# Patient Record
Sex: FEMALE | Race: BLACK OR AFRICAN AMERICAN | NOT HISPANIC OR LATINO | Employment: UNEMPLOYED | URBAN - METROPOLITAN AREA
[De-identification: names, ages, dates, MRNs, and addresses within clinical notes are randomized per-mention and may not be internally consistent; named-entity substitution may affect disease eponyms.]

---

## 2020-08-08 ENCOUNTER — OFFICE VISIT (OUTPATIENT)
Dept: URGENT CARE | Facility: CLINIC | Age: 55
End: 2020-08-08
Payer: COMMERCIAL

## 2020-08-08 ENCOUNTER — HOSPITAL ENCOUNTER (EMERGENCY)
Facility: HOSPITAL | Age: 55
Discharge: HOME | End: 2020-08-08
Attending: EMERGENCY MEDICINE
Payer: COMMERCIAL

## 2020-08-08 VITALS
WEIGHT: 185 LBS | SYSTOLIC BLOOD PRESSURE: 134 MMHG | BODY MASS INDEX: 30.82 KG/M2 | HEIGHT: 65 IN | HEART RATE: 84 BPM | DIASTOLIC BLOOD PRESSURE: 66 MMHG | OXYGEN SATURATION: 98 % | TEMPERATURE: 97 F

## 2020-08-08 VITALS
OXYGEN SATURATION: 99 % | DIASTOLIC BLOOD PRESSURE: 84 MMHG | WEIGHT: 185 LBS | HEART RATE: 76 BPM | BODY MASS INDEX: 30.82 KG/M2 | RESPIRATION RATE: 16 BRPM | HEIGHT: 65 IN | SYSTOLIC BLOOD PRESSURE: 130 MMHG | TEMPERATURE: 98 F

## 2020-08-08 DIAGNOSIS — Z53.21 PATIENT LEFT WITHOUT BEING SEEN: ICD-10-CM
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
## 2020-08-08 PROCEDURE — 99284 EMERGENCY DEPT VISIT MOD MDM: CPT | Mod: 25

## 2020-08-08 PROCEDURE — 90715 TDAP VACCINE 7 YRS/> IM: CPT | Performed by: PHYSICIAN ASSISTANT

## 2020-08-08 PROCEDURE — 63600175 PHARM REV CODE 636 W HCPCS: Performed by: PHYSICIAN ASSISTANT

## 2020-08-08 PROCEDURE — 99284 EMERGENCY DEPT VISIT MOD MDM: CPT | Mod: ,,, | Performed by: PHYSICIAN ASSISTANT

## 2020-08-08 PROCEDURE — 90471 IMMUNIZATION ADMIN: CPT | Performed by: PHYSICIAN ASSISTANT

## 2020-08-08 PROCEDURE — 25000003 PHARM REV CODE 250: Performed by: PHYSICIAN ASSISTANT

## 2020-08-08 PROCEDURE — 99284 PR EMERGENCY DEPT VISIT,LEVEL IV: ICD-10-PCS | Mod: ,,, | Performed by: PHYSICIAN ASSISTANT

## 2020-08-08 RX ORDER — AMOXICILLIN AND CLAVULANATE POTASSIUM 875; 125 MG/1; MG/1
1 TABLET, FILM COATED ORAL 2 TIMES DAILY
Qty: 14 TABLET | Refills: 0 | Status: SHIPPED | OUTPATIENT
Start: 2020-08-08 | End: 2020-10-08

## 2020-08-08 RX ORDER — AMOXICILLIN AND CLAVULANATE POTASSIUM 875; 125 MG/1; MG/1
1 TABLET, FILM COATED ORAL
Status: COMPLETED | OUTPATIENT
Start: 2020-08-08 | End: 2020-08-08

## 2020-08-08 RX ADMIN — AMOXICILLIN AND CLAVULANATE POTASSIUM 1 TABLET: 875; 125 TABLET, FILM COATED ORAL at 02:08

## 2020-08-08 RX ADMIN — CLOSTRIDIUM TETANI TOXOID ANTIGEN (FORMALDEHYDE INACTIVATED), CORYNEBACTERIUM DIPHTHERIAE TOXOID ANTIGEN (FORMALDEHYDE INACTIVATED), BORDETELLA PERTUSSIS TOXOID ANTIGEN (GLUTARALDEHYDE INACTIVATED), BORDETELLA PERTUSSIS FILAMENTOUS HEMAGGLUTININ ANTIGEN (FORMALDEHYDE INACTIVATED), BORDETELLA PERTUSSIS PERTACTIN ANTIGEN, AND BORDETELLA PERTUSSIS FIMBRIAE 2/3 ANTIGEN 0.5 ML: 5; 2; 2.5; 5; 3; 5 INJECTION, SUSPENSION INTRAMUSCULAR at 02:08

## 2020-08-08 NOTE — ED PROVIDER NOTES
"Encounter Date: 2020       History     Chief Complaint   Patient presents with    Animal Bite     dog bit on left leg by neighbors dog. [facility] picked up dog due to being aggressive, states she was called and told to come get a rabies regimen done      2:22 PM  Patient is a 55-year-old female who presents the ED with dog bite.  It happed at 9 AM this morning. She states that she was just walking in her yard when her neighbor's pit bull attacked her.  She was bit 3 times, on her stomach, right arm, and right calf.  She is complaining of 6/10 pain to the area.  She is unsure if her tetanus shot is up-to-date.    She contacted [facility] who referred her to the ED for evaluation.  They were able to get the dogs records and told her that the dogs rabies vaccine  in May of this year.    I talked to the [facility] officer who told me that they are quarantine the dog for 10 days and will do a "no sign of rabies" exam.  They will alert the patient if the dog fails the exam.    After shared decision making, patient would not like the rabies vaccine or immunoglobulins at this time and would prefer to wait.  She is agreeable to x-rays, Augmentin, and updating her tetanus shot today.        Review of patient's allergies indicates:  No Known Allergies  History reviewed. No pertinent past medical history.  Past Surgical History:   Procedure Laterality Date    BLADDER SURGERY       History reviewed. No pertinent family history.  Social History     Tobacco Use    Smoking status: Never Smoker    Smokeless tobacco: Never Used   Substance Use Topics    Alcohol use: Yes     Comment: social    Drug use: Never     Review of Systems   Constitutional: Negative for chills, diaphoresis and fever.   HENT: Negative for sore throat.    Respiratory: Negative for shortness of breath.    Cardiovascular: Negative for chest pain.   Gastrointestinal: Negative for nausea.   Genitourinary: Negative for dysuria.   Musculoskeletal: Positive for " myalgias. Negative for back pain.   Skin: Positive for color change and wound. Negative for rash.   Neurological: Negative for weakness.   Hematological: Does not bruise/bleed easily.       Physical Exam     Initial Vitals [08/08/20 1321]   BP Pulse Resp Temp SpO2   132/86 76 17 97.9 °F (36.6 °C) 98 %      MAP       --         Physical Exam    Vitals reviewed.  Constitutional: She appears well-developed and well-nourished. She is not diaphoretic. She is cooperative.  Non-toxic appearance. She does not have a sickly appearance. She does not appear ill. No distress. Face mask in place.   HENT:   Head: Normocephalic and atraumatic.   Nose: Nose normal.   Eyes: Conjunctivae and EOM are normal.   Neck: Normal range of motion.   Pulmonary/Chest: No respiratory distress.   Abdominal: She exhibits no distension.   Musculoskeletal: Normal range of motion.   Neurological: She is alert. She has normal strength.   Skin: Skin is warm and dry. Abrasion and ecchymosis noted. No rash noted. No pallor.   Bite marks noted to left calf with underlying hematoma.  Bite marks noted to superior umbilicus with underlying ecchymosis.  1 bite jono noted to right distal forearm with underlying hematoma.  No bleeding.  She has intact ROM of bilateral upper and lower extremities at all joints.  No abnormality in gait.                     ED Course   Procedures  Labs Reviewed - No data to display       Imaging Results          X-Ray Wrist Complete Left (Final result)  Result time 08/08/20 15:34:45    Final result by Oliver Shelley MD (08/08/20 15:34:45)                 Impression:      1. No acute displaced fractures.      Electronically signed by: Oliver Shelley MD  Date:    08/08/2020  Time:    15:34             Narrative:    EXAMINATION:  XR WRIST COMPLETE 3 VIEWS LEFT    CLINICAL HISTORY:  Bitten by dog, initial encounter    TECHNIQUE:  PA, lateral, and oblique views of the left wrist were  performed.    COMPARISON:  None.    FINDINGS:  No acute displaced fractures.  No suspicious lytic or blastic lesions.  Cartilage spaces are preserved.  No subluxation or dislocation.  Visualized soft tissues are unremarkable.  No radiopaque foreign bodies.                               X-Ray Tibia Fibula 2 View Left (Final result)  Result time 08/08/20 15:33:46    Final result by Farhan Rocha MD (08/08/20 15:33:46)                 Impression:      1. No acute displaced fracture or dislocation of the tibia or fibula.      Electronically signed by: Farhan Rocha MD  Date:    08/08/2020  Time:    15:33             Narrative:    EXAMINATION:  XR TIBIA FIBULA 2 VIEW LEFT    CLINICAL HISTORY:  Bitten by dog, initial encounter    TECHNIQUE:  AP and lateral views of the left tibia and fibula were performed.    COMPARISON:  None.    FINDINGS:  Four views left tibia fibula.    No acute displaced fracture or dislocation of the tibia or fibula.  No radiopaque foreign body.  The ankle appears intact.  The knee appears intact.                                 Medical Decision Making:   History:   Old Medical Records: I decided to obtain old medical records.  Initial Assessment:   Patient is a 55-year-old female who presents the ED with dog bite.    Differential Diagnosis:   Includes but is not limited to dog bite, ecchymosis, hematoma, retained foreign body.  Intact range of motion and strength.  Doubt fractures.  No signs of cellulitis or underlying abscess.  Clinical Tests:   Radiological Study: Ordered and Reviewed  ED Management:  Will obtain x-rays, give oral Augmentin, update tetanus shot, and reassess.    After shared decision making, patient does not want rabies immune globulin our vaccine at this time.  She wishes to wait.  She [facility] contact [facility].    X-ray showed no acute displaced fractures.  No radiopaque foreign body.    Patient updated with imaging results.  I will Rx Augmentin.  Apply  OTC triple antibiotic ointment 3 times a day as needed.  Follow-up sooner for signs of infection as discussed.  She will be notified by her [employer] officer, however she should call them if she does not hear from them.  She has their contact information.  Return to ED precautions given for signs of infection.  All of her questions were answered.  Patient comfortable with plan and stable for discharge.    I have reviewed patient's chart and discussed this case with my supervising MD.     Prema Hernandez PA-C  Emergent Department  [facility]  [facility]ink #[id number] or #[id number]                Attending Attestation:     Physician Attestation Statement for NP/PA:   I discussed this assessment and plan of this patient with the NP/PA, but I did not personally examine the patient. The face to face encounter was performed by the NP/PA.                                Clinical Impression:       ICD-10-CM ICD-9-CM   1. Dog bite, initial encounter  W54.0XXA 879.8     E906.0         Disposition:   Disposition: Discharged  Condition: Stable     ED Disposition Condition    Discharge Stable        ED Prescriptions     Medication Sig Dispense Start Date End Date Auth. Provider    amoxicillin-clavulanate 875-125mg (AUGMENTIN) 875-125 mg per tablet Take 1 tablet by mouth 2 (two) times daily. 14 tablet 8/8/2020  Prema Hernandez PA-C        Follow-up Information     Follow up With Specialties Details Why Contact Info    Les Box MD Internal Medicine Schedule an appointment as soon as possible for a visit   [address] [facility] [address]  [phone]      [facility] Emergency Medicine  If symptoms worsen [facility] [facility] [id number] [phone]                                     Prema Hernandez PA-C  08/08/20 [id number]       Briseyda Montes De Oca MD  08/08/20 8611

## 2020-08-08 NOTE — ED TRIAGE NOTES
"Pt states she was "attacked by a pit bull" earlier today.  States EMS was called to scene, told pt to do wound care.  Pt states [employer] called her and recommended she come to ED for rabies treatment b/c dog has not had rabies vaccine since May.  Pt went to urgent care but was sent here.   "

## 2020-08-08 NOTE — DISCHARGE INSTRUCTIONS
Take Augmentin 2 times a day for the next 7 days.   Look for signs of infection such as redness, swelling, pain, drainage and follow up sooner or return to the emergency department.   No future appointments.  Imaging Results              X-Ray Wrist Complete Left (Final result)  Result time 08/08/20 15:34:45      Final result by Oliver Shelley MD (08/08/20 15:34:45)                   Impression:      1. No acute displaced fractures.      Electronically signed by: Oliver Shelley MD  Date:    08/08/2020  Time:    15:34               Narrative:    EXAMINATION:  XR WRIST COMPLETE 3 VIEWS LEFT    CLINICAL HISTORY:  Bitten by dog, initial encounter    TECHNIQUE:  PA, lateral, and oblique views of the left wrist were performed.    COMPARISON:  None.    FINDINGS:  No acute displaced fractures.  No suspicious lytic or blastic lesions.  Cartilage spaces are preserved.  No subluxation or dislocation.  Visualized soft tissues are unremarkable.  No radiopaque foreign bodies.                                       X-Ray Tibia Fibula 2 View Left (Final result)  Result time 08/08/20 15:33:46      Final result by Farhan Rocha MD (08/08/20 15:33:46)                   Impression:      1. No acute displaced fracture or dislocation of the tibia or fibula.      Electronically signed by: Farhan Rocha MD  Date:    08/08/2020  Time:    15:33               Narrative:    EXAMINATION:  XR TIBIA FIBULA 2 VIEW LEFT    CLINICAL HISTORY:  Bitten by dog, initial encounter    TECHNIQUE:  AP and lateral views of the left tibia and fibula were performed.    COMPARISON:  None.    FINDINGS:  Four views left tibia fibula.    No acute displaced fracture or dislocation of the tibia or fibula.  No radiopaque foreign body.  The ankle appears intact.  The knee appears intact.

## 2020-08-08 NOTE — ED NOTES
Appearance:  Pt awake, alert & oriented to person, place & time.  Pt in no acute distress at present time.  Skin:  Skin warm, dry & intact.  Mucous membranes moist.  Skin turgor normal.  Puncture wound to abdomen, abrasions to left lower leg and left forearm.   Respiratory:  Respirations even, non-labored.    Neurologic:  Pt moving all extremities without difficulty.  Sensation intact.     Peripheral Vascular:  All peripheral pulses present.

## 2020-08-08 NOTE — PROGRESS NOTES
This encounter was created in error - please disregard.    Patient sent to this clinic by EMS and [facility] for Rabies vaccination after dog bite. Pt informed by [employer] officer dog that bit her not UTD on vaccines and needs to have vaccination.    Wounds to abdomen and left lower leg. Her wounds are wrapped and were cleaned by EMS on scene. Hemostasis achieved prior to visit. Patient is able to ambulate freely without assistance.    Discussed that ED would have Rabies vaccine but this clinic does not carry the product. Offered assistance with tetanus and inspecting wounds vs canceling visit and going to ED for treatment. Patient opted for ED.

## 2020-10-08 ENCOUNTER — OFFICE VISIT (OUTPATIENT)
Dept: URGENT CARE | Facility: CLINIC | Age: 55
End: 2020-10-08
Payer: COMMERCIAL

## 2020-10-08 VITALS
DIASTOLIC BLOOD PRESSURE: 83 MMHG | HEART RATE: 97 BPM | BODY MASS INDEX: 36.31 KG/M2 | HEIGHT: 60 IN | RESPIRATION RATE: 18 BRPM | WEIGHT: 184.94 LBS | OXYGEN SATURATION: 98 % | TEMPERATURE: 98 F | SYSTOLIC BLOOD PRESSURE: 110 MMHG

## 2020-10-08 DIAGNOSIS — T63.441A ALLERGIC REACTION TO BEE STING: ICD-10-CM

## 2020-10-08 DIAGNOSIS — T63.441A BEE STING, ACCIDENTAL OR UNINTENTIONAL, INITIAL ENCOUNTER: Primary | ICD-10-CM

## 2020-10-08 DIAGNOSIS — T78.3XXA ANGIOEDEMA OF LIPS, INITIAL ENCOUNTER: ICD-10-CM

## 2020-10-08 PROCEDURE — 96372 THER/PROPH/DIAG INJ SC/IM: CPT | Mod: S$GLB,,, | Performed by: NURSE PRACTITIONER

## 2020-10-08 PROCEDURE — 99203 OFFICE O/P NEW LOW 30 MIN: CPT | Mod: 25,S$GLB,, | Performed by: NURSE PRACTITIONER

## 2020-10-08 PROCEDURE — 96372 PR INJECTION,THERAP/PROPH/DIAG2ST, IM OR SUBCUT: ICD-10-PCS | Mod: S$GLB,,, | Performed by: NURSE PRACTITIONER

## 2020-10-08 PROCEDURE — 99203 PR OFFICE/OUTPT VISIT, NEW, LEVL III, 30-44 MIN: ICD-10-PCS | Mod: 25,S$GLB,, | Performed by: NURSE PRACTITIONER

## 2020-10-08 RX ORDER — METHYLPREDNISOLONE 4 MG/1
TABLET ORAL
Qty: 1 PACKAGE | Refills: 0 | Status: ON HOLD | OUTPATIENT
Start: 2020-10-08 | End: 2023-09-12 | Stop reason: HOSPADM

## 2020-10-08 NOTE — PROGRESS NOTES
Subjective:       Patient ID: Farzana Gallegos is a 55 y.o. female.    Vitals:  height is 5' (1.524 m) and weight is 83.9 kg (184 lb 15.5 oz). Her temperature is 98 °F (36.7 °C). Her blood pressure is 110/83 and her pulse is 97. Her respiration is 18 and oxygen saturation is 98%.     Chief Complaint: Insect Bite (lip)    Patient believes she was stung by a bee yesterday morning on top lip. Patient now has swelling to lip. She used ice on the site last night. Denies any other symptoms    Insect Bite  This is a new problem. The current episode started yesterday. The problem has been gradually worsening. Pertinent negatives include no arthralgias, chest pain, chills, congestion, coughing, fatigue, fever, headaches, joint swelling, myalgias, nausea, rash, sore throat, vertigo, vomiting or weakness. Nothing aggravates the symptoms. She has tried ice for the symptoms. The treatment provided mild relief.       Constitution: Negative for chills, fatigue and fever.   HENT: Positive for facial swelling (upper lip). Negative for facial trauma, congestion, sore throat, trouble swallowing and voice change.    Neck: Negative for painful lymph nodes.   Cardiovascular: Negative for chest pain and leg swelling.   Eyes: Negative for double vision and blurred vision.   Respiratory: Negative.  Negative for chest tightness, cough, shortness of breath, stridor and wheezing.    Gastrointestinal: Negative.  Negative for nausea, vomiting and diarrhea.   Genitourinary: Negative for dysuria, frequency, urgency and history of kidney stones.   Musculoskeletal: Negative.  Negative for joint pain, joint swelling, muscle cramps and muscle ache.   Skin: Positive for color change. Negative for pale, rash, erythema and bruising.   Allergic/Immunologic: Negative for seasonal allergies.   Neurological: Negative for dizziness, history of vertigo, light-headedness, passing out and headaches.   Hematologic/Lymphatic: Negative for swollen lymph nodes.    Psychiatric/Behavioral: Negative for nervous/anxious, sleep disturbance and depression. The patient is not nervous/anxious.        Objective:      Physical Exam   Constitutional: She is oriented to person, place, and time. She appears well-developed. She is cooperative.  Non-toxic appearance. She does not appear ill.   HENT:   Head: Normocephalic and atraumatic. Head is without abrasion, without contusion and without laceration.   Ears:   Right Ear: Tympanic membrane, external ear and ear canal normal.   Left Ear: Tympanic membrane, external ear and ear canal normal.   Nose: Nose normal.   Mouth/Throat: Uvula is midline, oropharynx is clear and moist and mucous membranes are normal. Mucous membranes are moist. No oropharyngeal exudate or posterior oropharyngeal erythema. Oropharynx is clear.       Eyes: Pupils are equal, round, and reactive to light. Conjunctivae, EOM and lids are normal.   Neck: Trachea normal, full passive range of motion without pain and phonation normal. Neck supple.   Cardiovascular: Normal rate, regular rhythm, S1 normal, S2 normal, normal heart sounds and normal pulses. Exam reveals no gallop and no friction rub.   No murmur heard.  Pulmonary/Chest: Effort normal and breath sounds normal. No stridor. No respiratory distress. She has no decreased breath sounds. She has no wheezes. She has no rhonchi.   Musculoskeletal: Normal range of motion.   Neurological: She is alert and oriented to person, place, and time.   Skin: Skin is warm, dry, intact, not diaphoretic and no rash. Capillary refill takes less than 2 seconds. abrasion, burn, bruising, erythema and ecchymosisPsychiatric: Her speech is normal and behavior is normal. Judgment and thought content normal.   Nursing note and vitals reviewed.        Assessment:       1. Bee sting, accidental or unintentional, initial encounter    2. Allergic reaction to bee sting    3. Angioedema of lips, initial encounter        Plan:         Bee sting,  accidental or unintentional, initial encounter  -     methylPREDNISolone sod suc(PF) injection 125 mg  -     methylPREDNISolone (MEDROL DOSEPACK) 4 mg tablet; use as directed  Dispense: 1 Package; Refill: 0    Allergic reaction to bee sting  -     methylPREDNISolone sod suc(PF) injection 125 mg  -     methylPREDNISolone (MEDROL DOSEPACK) 4 mg tablet; use as directed  Dispense: 1 Package; Refill: 0    Angioedema of lips, initial encounter          Patient Instructions     Insect Sting: Local Reaction   You have been stung or bitten by an insect. The insects venom or body fluid is causing your skin to react in the area where you were stung or bitten. This often causes redness, itching and swelling. This reaction will fade over a few hours, but it can last a few days. An insect bite or sting can become infected 1 to 3 days later, so watch for the signs below. Sometimes it is hard to tell the difference between a local reaction to the insect bite or sting and an early infection, so you may be given antibiotics.  Common insect stings causing problems are from wasps, bees, yellow jackets, and hornets. Common bites are from spiders, mosquitoes, fleas, or ticks. Other types of insects may be more common in different parts of the country or world.  Most people think of allergic reactions when someone has a rash or itchy skin. Symptoms can include:  · Rash, hives, redness, welts, or blisters  · Itching, burning, stinging, or pain  · Swelling around the sting area.  Sometimes swelling spreads to other areas.  Home care  Medicines  The healthcare provider may prescribe medicines to relieve swelling, itching, and pain. Follow the providers instructions when taking these medicines.  · If you had a severe reaction, the provider may prescribe an epinephrine kit. Epinephrine will stop an allergic reaction from getting worse. Before you leave the hospital, be sure that you understand when and how to use this  medicine.  · Diphenhydramine is an oral antihistamine available at drugstores and groceries. Unless a prescription antihistamine was given, you can use this medicine to reduce itching if large areas of the skin are involved. The medicine may make you sleepy, so be careful using it in the daytime or when going to school, working, or driving. Dont use diphenhydramine if you have glaucoma or if you are a man with trouble urinating because of an enlarged prostate. Other antihistamines cause less drowsiness and are good choices for daytime use. Ask your pharmacist for suggestions.  · Dont use diphenhydramine cream on your skin. In some people it can cause additional reaction and make you allergic to this medicine.  · Calamine lotion or oatmeal baths sometimes help with itching.  · You may use acetaminophen or ibuprofen to control pain, unless another pain medicine was prescribed. Talk with your healthcare provider before using these medicines if you have chronic liver or kidney disease. Also talk with your provider if youve had a stomach ulcer or GI bleeding.  General care    · If itching is a problem, dont take hot showers or baths. Stay out of direct sunlight. These heat up your skin and will make the itching worse.  · Use an ice pack to reduce local areas of redness and itching. You can make your own ice pack by putting ice cubes in a bag that seals and wrapping it in a thin towel. Dont put the ice directly on your skin, because it can damage the skin.  · Try not to scratch any affected areas and damage the skin. This will help prevent an infection.  · If oral antibiotics were prescribed, be sure to take them until finished.  Preventing future reactions  · Future reactions could be worse than this one, so try to stay away from places where you might be stung again.  · Be aware that honeybees nest in trees. Wasps and yellow jackets nest in the ground, trees, or roof eaves.  · If you are stung by a honeybee, a  stinger will remain in your skin. Wasps, yellow jackets, and hornets dont leave a stinger behind. Move away from the nest area right away. The stinger of a honeybee releases a substance that will attract other bees to you. Once you are away from the nest, then remove the stinger as quickly as possible.  · After any sting, you may apply ice and take diphenhydramine or another antihistamine. If you develop any of the warning signs below, seek help right away.  · If you are at high risk for another sting, or if your reaction included dizziness, fainting, or trouble breathing or swallowing, ask your doctor for an insect allergy kit.  · Remove any ticks on the skin with a set of fine tweezers.  the tick as close to the skin as possible. Pull back gently but firmly. Use an even, steady pressure. Dont jerk or twist. Dont squeeze, crush, or puncture the body of the tick. The bodily fluids may contain infection-causing germs. Dont use a smoldering match or cigarette, nail polish, petroleum jelly, liquid soap, or kerosene. These may irritate the tick. If any mouthparts of the tick remain in the skin, these should be left alone. They will fall off on their own. Trying to remove these parts may damage the skin unless they can be removed very easily. After the tick is removed, wash the bite area with rubbing alcohol, iodine, or soap and water.  Follow-up care  Follow up with your doctor in 2 days, or as advised, if your symptoms dont start to get better.  Call 911  Call 911 if any of these occur:  · Trouble breathing or swallowing, or wheezing  · New or worsening swelling in the mouth, throat, or tongue  · Hoarse voice or trouble speaking  · Confused  · Very drowsy or trouble awakening  · Fainting or loss of consciousness  · Rapid heart rate  · Low blood pressure  · Feeling of doom  · Nausea, vomiting, abdominal pain, or diarrhea  · Vomiting blood, or large amounts of blood in stool  · Seizure  When to seek medical  advice  Call your healthcare provider right away if any of these occur:  · Spreading areas of itching, redness or swelling  · New or worse swelling in the face, eyelids, or  lips  · Dizziness or weakness  Also call your provider right away if you have signs of infection:  · Spreading redness  · Increased pain or swelling  · Fever of 100.4°F (38°C) or higher, or as directed by your healthcare provider  · Colored fluid draining from the sting area   Date Last Reviewed: 10/1/2016  © 2727-0237 [employer]. [address] [place] [address]. All rights reserved. This information is not intended as a substitute for professional medical care. Always follow your healthcare professional's instructions.        Angioedema  Angioedema ([id number]-MELODY-[id number]) is a sudden appearance of swollen patches (edema) on the skin or mucous membranes. It most often involves the face, lips, mouth, tongue, back of throat, or vocal cords. It may also occur in other places, such as the arms or legs. A rash may also appear during the first 4 days of this illness.  There are different types of angioedema. Your symptoms will depend on what type of angioedema you have. Swelling and redness may be the main symptoms. Like allergic reactions, angioedema may include:  · Rash, hives, redness, welts, blisters  · Itching, burning, stinging, pain  · Dry, flaky, cracking, or scaly skin  · Swelling of the face, lips, tongue, or other parts of the body  More severe symptoms may include:  · Trouble swallowing, or feeling like your throat is closing  · Trouble breathing or wheezing  · Hoarse voice or trouble speaking  · Nausea, vomiting, diarrhea, or stomach cramps  · Feeling faint or lightheaded, rapid heart rate, or low blood pressure  Angioedema can be triggered by exposure to certain substances. Medical conditions involving the immune systems and certain infections may cause it. In rare cases, angioedema can be hereditary. Sometimes  the cause may be very clear. However, it is often hard to find a cause. The most common causes include:  · Foods, such as shrimp, shellfish, peanuts, milk products, gluten, and eggs; also colorings, flavorings, and additives  · Insect bites or stings, from bees, mosquitos, fleas, or ticks  · Medicines, such as ACE inhibitors, penicillin, sulfa drugs, amoxicillin, aspirin, and ibuprofen  · Latex, which may be in gloves, clothes, toys, balloons, and some kinds of tape. People who are allergic to latex may have problems with foods such as bananas, avocados, kiwi, papaya, or chestnuts.  · Stress  · Heat, cold, or sunlight  The most common cause of angioedema is a reaction to a class of medicines called ACE inhibitors. These are used to treat high blood pressure. ACE inhibitors include captopril, enalapril, and lisinopril. Angiodema can happen even after you have been taking the medicine for some time. Tell your doctor if you have angioedema symptoms and are taking any of these medicines. Angioedema may recur. It is important to watch for the earliest signs of this condition (see the list below). Contact your healthcare provider right away if swelling involves the face, mouth, or throat.  Home care  Rest quietly today. Avoid vigorous physical activity.  Medicines: The healthcare provider may prescribe medicines for itching, swelling, or pain. Follow the healthcare providers instructions when taking these medicines.  · Oral diphenhydramine is an antihistamine available without a prescription. Unless a prescription antihistamine was given, diphenhydramine may be used to reduce widespread itching. It may make you sleepy, so be careful using it when going to school, working, or driving. (Note: Do not use diphenhydramine if you have glaucoma or if you are a man who has trouble urinating due to an enlarged prostate.) Loratadine is an antihistamine that may cause less drowsiness.  · Do not use diphenhydramine cream on your  skin. Some people can have an allergic reaction to this.  · Calamine lotion or oatmeal baths sometimes help with itching.  · You may use acetaminophen or ibuprofen for pain, unless another pain medicine was prescribed.  · If you were told that your angioedema was caused by a medicine you are taking, you must stop taking it. Ask your healthcare provider for a different one. In the future, advise medical staff that you are allergic to this medicine.  · If medicine was prescribed, such as steroids or antihistamines, be sure you understand what the medicine is and how to take it.   General care  · Make sure you do not scratch areas of the body that had a reaction. This will help prevent infection.   · Stay away from air pollution, tobacco, and wood smoke. Also stay away from cold temperatures. These things can make allergy symptoms worse.  · Try to find out what cause your reaction. Make sure to remove the allergen. Future reactions may be worse.   · If you have a serious allergy, wear a medical alert bracelet that notes this allergy.  · If the healthcare provider prescribed an epinephrine auto injector kit, keep it with you at all times.   · Tell all care providers about your allergy. Ask them h ow to use any prescribed medicines.  · Keep a record of allergies and symptoms, and when they occurred. This will help your provider treat you over time.   Follow-up care  Follow up with your healthcare provider, or as advised. You may need to see an allergist. An allergist can help find the cause of an allergic reaction and give recommendations on how to prevent future reactions.  Call 911  Contact emergency services right away if any of these occur:  · Trouble breathing or swallowing, or wheezing  · Hoarse voice or trouble speaking, or drooling  · Chest pain or tightness  · Confusion, lightheadedness, or dizziness  · Extreme drowsiness or trouble awakening  · Fainting or loss of consciousness  · Rapid heart rate  · Vomiting  blood, or large amounts of blood in stool  · Seizure  · Nausea, vomiting, diarrhea, abdominal pain, or stomach cramps  When to seek medical attention  Call your healthcare provider right away if any of the following occur:  · Symptoms don't go away  · Symptoms come back  · Symptoms get worse or new symptoms develop  · Hives feel uncomfortable  · Fever of 100.4°F (38°C), or as directed by your healthcare professional  Date Last Reviewed: 5/1/2017  © 1428-3805 [employer]. [address] [place] [address], [place] [address]. All rights reserved. This information is not intended as a substitute for professional medical care. Always follow your healthcare professional's instructions.

## 2020-10-08 NOTE — PATIENT INSTRUCTIONS
Insect Sting: Local Reaction   You have been stung or bitten by an insect. The insects venom or body fluid is causing your skin to react in the area where you were stung or bitten. This often causes redness, itching and swelling. This reaction will fade over a few hours, but it can last a few days. An insect bite or sting can become infected 1 to 3 days later, so watch for the signs below. Sometimes it is hard to tell the difference between a local reaction to the insect bite or sting and an early infection, so you may be given antibiotics.  Common insect stings causing problems are from wasps, bees, yellow jackets, and hornets. Common bites are from spiders, mosquitoes, fleas, or ticks. Other types of insects may be more common in different parts of the country or world.  Most people think of allergic reactions when someone has a rash or itchy skin. Symptoms can include:  · Rash, hives, redness, welts, or blisters  · Itching, burning, stinging, or pain  · Swelling around the sting area.  Sometimes swelling spreads to other areas.  Home care  Medicines  The healthcare provider may prescribe medicines to relieve swelling, itching, and pain. Follow the providers instructions when taking these medicines.  · If you had a severe reaction, the provider may prescribe an epinephrine kit. Epinephrine will stop an allergic reaction from getting worse. Before you leave the hospital, be sure that you understand when and how to use this medicine.  · Diphenhydramine is an oral antihistamine available at drugstores and groceries. Unless a prescription antihistamine was given, you can use this medicine to reduce itching if large areas of the skin are involved. The medicine may make you sleepy, so be careful using it in the daytime or when going to school, working, or driving. Dont use diphenhydramine if you have glaucoma or if you are a man with trouble urinating because of an enlarged prostate. Other antihistamines cause less  drowsiness and are good choices for daytime use. Ask your pharmacist for suggestions.  · Dont use diphenhydramine cream on your skin. In some people it can cause additional reaction and make you allergic to this medicine.  · Calamine lotion or oatmeal baths sometimes help with itching.  · You may use acetaminophen or ibuprofen to control pain, unless another pain medicine was prescribed. Talk with your healthcare provider before using these medicines if you have chronic liver or kidney disease. Also talk with your provider if youve had a stomach ulcer or GI bleeding.  General care    · If itching is a problem, dont take hot showers or baths. Stay out of direct sunlight. These heat up your skin and will make the itching worse.  · Use an ice pack to reduce local areas of redness and itching. You can make your own ice pack by putting ice cubes in a bag that seals and wrapping it in a thin towel. Dont put the ice directly on your skin, because it can damage the skin.  · Try not to scratch any affected areas and damage the skin. This will help prevent an infection.  · If oral antibiotics were prescribed, be sure to take them until finished.  Preventing future reactions  · Future reactions could be worse than this one, so try to stay away from places where you might be stung again.  · Be aware that honeybees nest in trees. Wasps and yellow jackets nest in the ground, trees, or roof eaves.  · If you are stung by a honeybee, a stinger will remain in your skin. Wasps, yellow jackets, and hornets dont leave a stinger behind. Move away from the nest area right away. The stinger of a honeybee releases a substance that will attract other bees to you. Once you are away from the nest, then remove the stinger as quickly as possible.  · After any sting, you may apply ice and take diphenhydramine or another antihistamine. If you develop any of the warning signs below, seek help right away.  · If you are at high risk for another  sting, or if your reaction included dizziness, fainting, or trouble breathing or swallowing, ask your doctor for an insect allergy kit.  · Remove any ticks on the skin with a set of fine tweezers.  the tick as close to the skin as possible. Pull back gently but firmly. Use an even, steady pressure. Dont jerk or twist. Dont squeeze, crush, or puncture the body of the tick. The bodily fluids may contain infection-causing germs. Dont use a smoldering match or cigarette, nail polish, petroleum jelly, liquid soap, or kerosene. These may irritate the tick. If any mouthparts of the tick remain in the skin, these should be left alone. They will fall off on their own. Trying to remove these parts may damage the skin unless they can be removed very easily. After the tick is removed, wash the bite area with rubbing alcohol, iodine, or soap and water.  Follow-up care  Follow up with your doctor in 2 days, or as advised, if your symptoms dont start to get better.  Call 911  Call 911 if any of these occur:  · Trouble breathing or swallowing, or wheezing  · New or worsening swelling in the mouth, throat, or tongue  · Hoarse voice or trouble speaking  · Confused  · Very drowsy or trouble awakening  · Fainting or loss of consciousness  · Rapid heart rate  · Low blood pressure  · Feeling of doom  · Nausea, vomiting, abdominal pain, or diarrhea  · Vomiting blood, or large amounts of blood in stool  · Seizure  When to seek medical advice  Call your healthcare provider right away if any of these occur:  · Spreading areas of itching, redness or swelling  · New or worse swelling in the face, eyelids, or  lips  · Dizziness or weakness  Also call your provider right away if you have signs of infection:  · Spreading redness  · Increased pain or swelling  · Fever of 100.4°F (38°C) or higher, or as directed by your healthcare provider  · Colored fluid draining from the sting area   Date Last Reviewed: 10/1/2016  © 6056-1172 [employer]. [address] [place] [id number]. All rights reserved. This information is not intended as a substitute for professional medical care. Always follow your healthcare professional's instructions.        Angioedema  Angioedema ([id number]-[id number]-eh-MELODY-muh) is a sudden appearance of swollen patches (edema) on the skin or mucous membranes. It most often involves the face, lips, mouth, tongue, back of throat, or vocal cords. It may also occur in other places, such as the arms or legs. A rash may also appear during the first 4 days of this illness.  There are different types of angioedema. Your symptoms will depend on what type of angioedema you have. Swelling and redness may be the main symptoms. Like allergic reactions, angioedema may include:  · Rash, hives, redness, welts, blisters  · Itching, burning, stinging, pain  · Dry, flaky, cracking, or scaly skin  · Swelling of the face, lips, tongue, or other parts of the body  More severe symptoms may include:  · Trouble swallowing, or feeling like your throat is closing  · Trouble breathing or wheezing  · Hoarse voice or trouble speaking  · Nausea, vomiting, diarrhea, or stomach cramps  · Feeling faint or lightheaded, rapid heart rate, or low blood pressure  Angioedema can be triggered by exposure to certain substances. Medical conditions involving the immune systems and certain infections may cause it. In rare cases, angioedema can be hereditary. Sometimes the cause may be very clear. However, it is often hard to find a cause. The most common causes include:  · Foods, such as shrimp, shellfish, peanuts, milk products, gluten, and eggs; also colorings, flavorings, and additives  · Insect bites or stings, from bees, mosquitos, fleas, or ticks  · Medicines, such as ACE inhibitors, penicillin, sulfa drugs, amoxicillin, aspirin, and ibuprofen  · Latex, which may be in gloves, clothes, toys, balloons, and some kinds of tape. People who are allergic to  latex may have problems with foods such as bananas, avocados, kiwi, papaya, or chestnuts.  · Stress  · Heat, cold, or sunlight  The most common cause of angioedema is a reaction to a class of medicines called ACE inhibitors. These are used to treat high blood pressure. ACE inhibitors include captopril, enalapril, and lisinopril. Angiodema can happen even after you have been taking the medicine for some time. Tell your doctor if you have angioedema symptoms and are taking any of these medicines. Angioedema may recur. It is important to watch for the earliest signs of this condition (see the list below). Contact your healthcare provider right away if swelling involves the face, mouth, or throat.  Home care  Rest quietly today. Avoid vigorous physical activity.  Medicines: The healthcare provider may prescribe medicines for itching, swelling, or pain. Follow the healthcare providers instructions when taking these medicines.  · Oral diphenhydramine is an antihistamine available without a prescription. Unless a prescription antihistamine was given, diphenhydramine may be used to reduce widespread itching. It may make you sleepy, so be careful using it when going to school, working, or driving. (Note: Do not use diphenhydramine if you have glaucoma or if you are a man who has trouble urinating due to an enlarged prostate.) Loratadine is an antihistamine that may cause less drowsiness.  · Do not use diphenhydramine cream on your skin. Some people can have an allergic reaction to this.  · Calamine lotion or oatmeal baths sometimes help with itching.  · You may use acetaminophen or ibuprofen for pain, unless another pain medicine was prescribed.  · If you were told that your angioedema was caused by a medicine you are taking, you must stop taking it. Ask your healthcare provider for a different one. In the future, advise medical staff that you are allergic to this medicine.  · If medicine was prescribed, such as steroids or  antihistamines, be sure you understand what the medicine is and how to take it.   General care  · Make sure you do not scratch areas of the body that had a reaction. This will help prevent infection.   · Stay away from air pollution, tobacco, and wood smoke. Also stay away from cold temperatures. These things can make allergy symptoms worse.  · Try to find out what cause your reaction. Make sure to remove the allergen. Future reactions may be worse.   · If you have a serious allergy, wear a medical alert bracelet that notes this allergy.  · If the healthcare provider prescribed an epinephrine auto injector kit, keep it with you at all times.   · Tell all care providers about your allergy. Ask them h ow to use any prescribed medicines.  · Keep a record of allergies and symptoms, and when they occurred. This will help your provider treat you over time.   Follow-up care  Follow up with your healthcare provider, or as advised. You may need to see an allergist. An allergist can help find the cause of an allergic reaction and give recommendations on how to prevent future reactions.  Call 911  Contact emergency services right away if any of these occur:  · Trouble breathing or swallowing, or wheezing  · Hoarse voice or trouble speaking, or drooling  · Chest pain or tightness  · Confusion, lightheadedness, or dizziness  · Extreme drowsiness or trouble awakening  · Fainting or loss of consciousness  · Rapid heart rate  · Vomiting blood, or large amounts of blood in stool  · Seizure  · Nausea, vomiting, diarrhea, abdominal pain, or stomach cramps  When to seek medical attention  Call your healthcare provider right away if any of the following occur:  · Symptoms don't go away  · Symptoms come back  · Symptoms get worse or new symptoms develop  · Hives feel uncomfortable  · Fever of 100.4°F (38°C), or as directed by your healthcare professional  Date Last Reviewed: 5/1/2017  © 1779-7349 [employer]. [address] [place] [address]  [place] [address]. All rights reserved. This information is not intended as a substitute for professional medical care. Always follow your healthcare professional's instructions.

## 2021-03-20 ENCOUNTER — IMMUNIZATION (OUTPATIENT)
Dept: PRIMARY CARE CLINIC | Facility: CLINIC | Age: 56
End: 2021-03-20
Payer: COMMERCIAL

## 2021-03-20 DIAGNOSIS — Z23 NEED FOR VACCINATION: Primary | ICD-10-CM

## 2021-03-20 PROCEDURE — 91300 PR SARS-COV- 2 COVID-19 VACCINE, NO PRSV, 30MCG/0.3ML, IM: ICD-10-PCS | Mod: S$GLB,,, | Performed by: INTERNAL MEDICINE

## 2021-03-20 PROCEDURE — 0001A PR IMMUNIZ ADMIN, SARS-COV-2 COVID-19 VACC, 30MCG/0.3ML, 1ST DOSE: ICD-10-PCS | Mod: CV19,S$GLB,, | Performed by: INTERNAL MEDICINE

## 2021-03-20 PROCEDURE — 0001A PR IMMUNIZ ADMIN, SARS-COV-2 COVID-19 VACC, 30MCG/0.3ML, 1ST DOSE: CPT | Mod: CV19,S$GLB,, | Performed by: INTERNAL MEDICINE

## 2021-03-20 PROCEDURE — 91300 PR SARS-COV- 2 COVID-19 VACCINE, NO PRSV, 30MCG/0.3ML, IM: CPT | Mod: S$GLB,,, | Performed by: INTERNAL MEDICINE

## 2021-03-20 RX ADMIN — Medication 0.3 ML: at 01:03

## 2021-04-18 ENCOUNTER — IMMUNIZATION (OUTPATIENT)
Dept: PRIMARY CARE CLINIC | Facility: CLINIC | Age: 56
End: 2021-04-18
Payer: COMMERCIAL

## 2021-04-18 DIAGNOSIS — Z23 NEED FOR VACCINATION: Primary | ICD-10-CM

## 2021-04-18 PROCEDURE — 0002A PR IMMUNIZ ADMIN, SARS-COV-2 COVID-19 VACC, 30MCG/0.3ML, 2ND DOSE: CPT | Mod: CV19,S$GLB,, | Performed by: INTERNAL MEDICINE

## 2021-04-18 PROCEDURE — 91300 PR SARS-COV- 2 COVID-19 VACCINE, NO PRSV, 30MCG/0.3ML, IM: ICD-10-PCS | Mod: S$GLB,,, | Performed by: INTERNAL MEDICINE

## 2021-04-18 PROCEDURE — 0002A PR IMMUNIZ ADMIN, SARS-COV-2 COVID-19 VACC, 30MCG/0.3ML, 2ND DOSE: ICD-10-PCS | Mod: CV19,S$GLB,, | Performed by: INTERNAL MEDICINE

## 2021-04-18 PROCEDURE — 91300 PR SARS-COV- 2 COVID-19 VACCINE, NO PRSV, 30MCG/0.3ML, IM: CPT | Mod: S$GLB,,, | Performed by: INTERNAL MEDICINE

## 2021-04-18 RX ADMIN — Medication 0.3 ML: at 11:04

## 2021-08-17 ENCOUNTER — TELEPHONE (OUTPATIENT)
Dept: PRIMARY CARE CLINIC | Facility: CLINIC | Age: 56
End: 2021-08-17

## 2021-11-02 ENCOUNTER — IMMUNIZATION (OUTPATIENT)
Dept: INTERNAL MEDICINE | Facility: CLINIC | Age: 56
End: 2021-11-02
Payer: COMMERCIAL

## 2021-11-02 DIAGNOSIS — Z23 NEED FOR VACCINATION: Primary | ICD-10-CM

## 2021-11-02 PROCEDURE — 91300 COVID-19, MRNA, LNP-S, PF, 30 MCG/0.3 ML DOSE VACCINE: CPT | Mod: PBBFAC | Performed by: INTERNAL MEDICINE

## 2021-11-02 PROCEDURE — 0004A COVID-19, MRNA, LNP-S, PF, 30 MCG/0.3 ML DOSE VACCINE: ICD-10-PCS | Mod: CV19,S$GLB,, | Performed by: INTERNAL MEDICINE

## 2021-11-02 PROCEDURE — 0004A COVID-19, MRNA, LNP-S, PF, 30 MCG/0.3 ML DOSE VACCINE: CPT | Mod: CV19,S$GLB,, | Performed by: INTERNAL MEDICINE

## 2022-09-15 ENCOUNTER — OFFICE VISIT (OUTPATIENT)
Dept: OBSTETRICS AND GYNECOLOGY | Facility: CLINIC | Age: 57
End: 2022-09-15
Payer: COMMERCIAL

## 2022-09-15 VITALS
BODY MASS INDEX: 40.3 KG/M2 | HEIGHT: 60 IN | SYSTOLIC BLOOD PRESSURE: 124 MMHG | DIASTOLIC BLOOD PRESSURE: 70 MMHG | WEIGHT: 205.25 LBS

## 2022-09-15 DIAGNOSIS — N95.2 ATROPHIC VAGINITIS: ICD-10-CM

## 2022-09-15 DIAGNOSIS — N39.46 MIXED STRESS AND URGE URINARY INCONTINENCE: ICD-10-CM

## 2022-09-15 DIAGNOSIS — R19.8 CHANGE IN BOWEL FUNCTION: ICD-10-CM

## 2022-09-15 DIAGNOSIS — Z12.31 SCREENING MAMMOGRAM, ENCOUNTER FOR: ICD-10-CM

## 2022-09-15 DIAGNOSIS — Z01.419 ROUTINE GYNECOLOGICAL EXAMINATION: Primary | ICD-10-CM

## 2022-09-15 DIAGNOSIS — N94.10 DYSPAREUNIA, FEMALE: ICD-10-CM

## 2022-09-15 PROCEDURE — 1159F PR MEDICATION LIST DOCUMENTED IN MEDICAL RECORD: ICD-10-PCS | Mod: CPTII,S$GLB,, | Performed by: OBSTETRICS & GYNECOLOGY

## 2022-09-15 PROCEDURE — 99999 PR PBB SHADOW E&M-EST. PATIENT-LVL III: CPT | Mod: PBBFAC,,, | Performed by: OBSTETRICS & GYNECOLOGY

## 2022-09-15 PROCEDURE — 3078F PR MOST RECENT DIASTOLIC BLOOD PRESSURE < 80 MM HG: ICD-10-PCS | Mod: CPTII,S$GLB,, | Performed by: OBSTETRICS & GYNECOLOGY

## 2022-09-15 PROCEDURE — 1160F PR REVIEW ALL MEDS BY PRESCRIBER/CLIN PHARMACIST DOCUMENTED: ICD-10-PCS | Mod: CPTII,S$GLB,, | Performed by: OBSTETRICS & GYNECOLOGY

## 2022-09-15 PROCEDURE — 88175 CYTOPATH C/V AUTO FLUID REDO: CPT | Performed by: OBSTETRICS & GYNECOLOGY

## 2022-09-15 PROCEDURE — 3078F DIAST BP <80 MM HG: CPT | Mod: CPTII,S$GLB,, | Performed by: OBSTETRICS & GYNECOLOGY

## 2022-09-15 PROCEDURE — 99386 PREV VISIT NEW AGE 40-64: CPT | Mod: S$GLB,,, | Performed by: OBSTETRICS & GYNECOLOGY

## 2022-09-15 PROCEDURE — 3008F BODY MASS INDEX DOCD: CPT | Mod: CPTII,S$GLB,, | Performed by: OBSTETRICS & GYNECOLOGY

## 2022-09-15 PROCEDURE — 1159F MED LIST DOCD IN RCRD: CPT | Mod: CPTII,S$GLB,, | Performed by: OBSTETRICS & GYNECOLOGY

## 2022-09-15 PROCEDURE — 1160F RVW MEDS BY RX/DR IN RCRD: CPT | Mod: CPTII,S$GLB,, | Performed by: OBSTETRICS & GYNECOLOGY

## 2022-09-15 PROCEDURE — 87086 URINE CULTURE/COLONY COUNT: CPT | Performed by: OBSTETRICS & GYNECOLOGY

## 2022-09-15 PROCEDURE — 99999 PR PBB SHADOW E&M-EST. PATIENT-LVL III: ICD-10-PCS | Mod: PBBFAC,,, | Performed by: OBSTETRICS & GYNECOLOGY

## 2022-09-15 PROCEDURE — 99386 PR PREVENTIVE VISIT,NEW,40-64: ICD-10-PCS | Mod: S$GLB,,, | Performed by: OBSTETRICS & GYNECOLOGY

## 2022-09-15 PROCEDURE — 3074F SYST BP LT 130 MM HG: CPT | Mod: CPTII,S$GLB,, | Performed by: OBSTETRICS & GYNECOLOGY

## 2022-09-15 PROCEDURE — 3074F PR MOST RECENT SYSTOLIC BLOOD PRESSURE < 130 MM HG: ICD-10-PCS | Mod: CPTII,S$GLB,, | Performed by: OBSTETRICS & GYNECOLOGY

## 2022-09-15 PROCEDURE — 3008F PR BODY MASS INDEX (BMI) DOCUMENTED: ICD-10-PCS | Mod: CPTII,S$GLB,, | Performed by: OBSTETRICS & GYNECOLOGY

## 2022-09-15 RX ORDER — ESTRADIOL 0.1 MG/G
1 CREAM VAGINAL DAILY
Qty: 45 G | Refills: 12 | Status: SHIPPED | OUTPATIENT
Start: 2022-09-15 | End: 2023-09-15

## 2022-09-15 NOTE — PROGRESS NOTES
PT HERE FOR ANNUAL.  IN MENOPAUSE. HAD NOT HAD RELATIONS IN 4 YRS UNTIL RECENTLY (MET SOMEONE) AFTER DIVORCE. ENTRY IS PAINFUL.  HAS HAD ? BLADDER SURGERY 14 YRS AGO. NOW FREDRICK, URGENCY AND NOCTURIA.  CAN'T SLEEP  CHANGE IN STOOL. SOFT AND HAS TO GO RIGHT AFTER EATING.    ROS:  GENERAL: No fever, chills, fatigability or weight loss.  VULVAR: No pain, no lesions and no itching.  VAGINAL: No relaxation, no itching, no discharge, no abnormal bleeding and no lesions.  ABDOMEN: No abdominal pain. Denies nausea. Denies vomiting. No diarrhea. No constipation  BREAST: Denies pain. No lumps. No discharge.  URINARY: No incontinence, no nocturia, no frequency and no dysuria.  CARDIOVASCULAR: No chest pain. No shortness of breath. No leg cramps.  NEUROLOGICAL: No headaches. No vision changes.  The remainder of the review of systems was negative.    PE:  General Appearance: obese And Well developed. Well nourished. In no acute distress.  Vulva: Lesions: No.  Urethral Meatus: Normal size. Normal location. No lesions. No prolapse.  Urethra: No masses. No tenderness. No prolapse. No scarring.  Bladder: No masses. No tenderness.  Vagina: Mucosa NI:yes discharge no, atrophy yes, cystocele no or rectocele no.  Cervix: Lesion: no  Stenotic: no Cervical motion tenderness: no  Uterus: Uterus size: 6 weeks. Support good. Uterus size: Normal  Adnexa: Masses: No Tenderness: No CDS Nodularity: No  Abdomen: obese No masses. No tenderness.  Breasts: No bilateral masses. No bilateral discharge. No bilateral tenderness. No bilateral fibrocystic changes.  Neck: No thyroid enlargement. No thyroid masses.  Skin: Rashes: No      PROCEDURES:    PLAN:     DIAGNOSIS:  1. Routine gynecological examination    2. Screening mammogram, encounter for    3. Atrophic vaginitis    4. Dyspareunia, female    5. Mixed stress and urge urinary incontinence    6. Change in bowel function        MEDICATIONS & ORDERS:  Orders Placed This Encounter    Urine culture     Mammo Digital Screening Bilat    Ambulatory referral/consult to Urogynecology    Ambulatory referral/consult to Gastroenterology    Liquid-Based Pap Smear, Screening    estradioL (ESTRACE) 0.01 % (0.1 mg/gram) vaginal cream       Patient was counseled today on the new ACS guidelines for cervical cytology screening as well as the current recommendations for breast cancer screening. She was counseled to follow up with her PCP for other routine health maintenance. Counseling session lasted approximately 10 minutes, and all her questions were answered.     20 MIN D/W PT ON ATROPHY AND OTHER ISSUES    FOLLOW-UP: With me in 12 month    Joel Booth Jr, MD, FACOG

## 2022-09-17 LAB — BACTERIA UR CULT: NO GROWTH

## 2022-09-20 LAB
FINAL PATHOLOGIC DIAGNOSIS: NORMAL
Lab: NORMAL

## 2022-09-22 ENCOUNTER — OFFICE VISIT (OUTPATIENT)
Dept: OTOLARYNGOLOGY | Facility: CLINIC | Age: 57
End: 2022-09-22
Payer: COMMERCIAL

## 2022-09-22 DIAGNOSIS — H91.90 HEARING LOSS, UNSPECIFIED HEARING LOSS TYPE, UNSPECIFIED LATERALITY: ICD-10-CM

## 2022-09-22 DIAGNOSIS — H61.22 EXCESSIVE CERUMEN IN LEFT EAR CANAL: ICD-10-CM

## 2022-09-22 DIAGNOSIS — H60.8X2 CHRONIC ECZEMATOUS OTITIS EXTERNA OF LEFT EAR: Primary | ICD-10-CM

## 2022-09-22 PROCEDURE — 1160F PR REVIEW ALL MEDS BY PRESCRIBER/CLIN PHARMACIST DOCUMENTED: ICD-10-PCS | Mod: CPTII,S$GLB,, | Performed by: NURSE PRACTITIONER

## 2022-09-22 PROCEDURE — 1159F MED LIST DOCD IN RCRD: CPT | Mod: CPTII,S$GLB,, | Performed by: NURSE PRACTITIONER

## 2022-09-22 PROCEDURE — 99212 OFFICE O/P EST SF 10 MIN: CPT | Mod: 25,S$GLB,, | Performed by: NURSE PRACTITIONER

## 2022-09-22 PROCEDURE — 99999 PR PBB SHADOW E&M-EST. PATIENT-LVL III: CPT | Mod: PBBFAC,,, | Performed by: NURSE PRACTITIONER

## 2022-09-22 PROCEDURE — 1159F PR MEDICATION LIST DOCUMENTED IN MEDICAL RECORD: ICD-10-PCS | Mod: CPTII,S$GLB,, | Performed by: NURSE PRACTITIONER

## 2022-09-22 PROCEDURE — 69210 REMOVE IMPACTED EAR WAX UNI: CPT | Mod: S$GLB,,, | Performed by: PHYSICIAN ASSISTANT

## 2022-09-22 PROCEDURE — 99999 PR PBB SHADOW E&M-EST. PATIENT-LVL III: ICD-10-PCS | Mod: PBBFAC,,, | Performed by: NURSE PRACTITIONER

## 2022-09-22 PROCEDURE — 1160F RVW MEDS BY RX/DR IN RCRD: CPT | Mod: CPTII,S$GLB,, | Performed by: NURSE PRACTITIONER

## 2022-09-22 PROCEDURE — 69210 EAR CERUMEN REMOVAL: ICD-10-PCS | Mod: S$GLB,,, | Performed by: PHYSICIAN ASSISTANT

## 2022-09-22 PROCEDURE — 99212 PR OFFICE/OUTPT VISIT, EST, LEVL II, 10-19 MIN: ICD-10-PCS | Mod: 25,S$GLB,, | Performed by: NURSE PRACTITIONER

## 2022-09-22 RX ORDER — BETAMETHASONE VALERATE 0.1 %
LOTION (ML) TOPICAL 2 TIMES DAILY
Qty: 60 ML | Refills: 2 | Status: SHIPPED | OUTPATIENT
Start: 2022-09-22 | End: 2022-10-07

## 2022-09-22 RX ORDER — LEVOCETIRIZINE DIHYDROCHLORIDE 5 MG/1
5 TABLET, FILM COATED ORAL NIGHTLY
Qty: 30 TABLET | Refills: 2 | Status: SHIPPED | OUTPATIENT
Start: 2022-09-22

## 2022-09-22 NOTE — PROGRESS NOTES
Subjective:      Farzana Cunningham is a 57 y.o. female who was self-referred for R ear  crusting/discharge.  Patient reports L ear discharge for the last month, described as a waxy/dry buildup in her meatus.  Patient denies any pain, tinnitus, or liquid discharge, but does report itchy skin to the ear canal.  Patient reports using qtips every other day as an adult.  Patient also reports bilateral hearing loss (L>R) but denies tinnitus.  Patient also reports lightheadedness with quick head movements (denies vertigo).      There is not a family history of hearing loss at a young age.  There is not a prior history of ear surgery.  There is not a prior history of ear infections .  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  She does not wear hearing aids currently.  She has not had a hearing test recently.     Past Medical History  She has no past medical history on file.    Past Surgical History  She has a past surgical history that includes Bladder surgery.    Family History  Her family history is not on file.    Social History  She reports that she has never smoked. She has never used smokeless tobacco. She reports current alcohol use. She reports that she does not use drugs.    Allergies  She has No Known Allergies.    Medications  She has a current medication list which includes the following prescription(s): estradiol, methylprednisolone, betamethasone valerate 0.1%, and levocetirizine.    Review of Systems   Constitutional: Negative.    HENT:  Positive for ear discharge, ear pain, hearing loss, mouth sores and voice change.    Cardiovascular: Negative.    Endocrine: Negative.    Genitourinary: Negative.    Musculoskeletal: Negative.    Skin: Negative.    Allergic/Immunologic: Negative.    Neurological:  Positive for light-headedness. Negative for dizziness (denies room spinning sensation).   Hematological: Negative.    Psychiatric/Behavioral:  Positive for  decreased concentration and sleep disturbance.         Objective:     There were no vitals taken for this visit.     Constitutional:   Vital signs are normal. She appears well-developed and well-nourished.     Head:  Normocephalic and atraumatic.     Ears:    Right Ear: There is swelling. No drainage or tenderness. Tympanic membrane is erythematous. Tympanic membrane is not perforated and not bulging. Decreased hearing is noted.   Left Ear: There is swelling. No drainage or tenderness. Tympanic membrane is erythematous. Tympanic membrane is not perforated and not bulging. Decreased hearing is noted.   Mild outer EAC edema with erythema and scaling    Mouth/Throat  Oropharynx clear and moist without lesions or asymmetry and normal uvula midline.     Procedure    Cerumen removal performed.  See procedure note.        Data Reviewed    No results found for: WBC  No results found for: PLT   No results found for: CREATININE  No results found for: TSH  No results found for: GLU  No results found for: HGBA1C         Assessment:     1. Chronic eczematous otitis externa of left ear    2. Hearing loss, unspecified hearing loss type, unspecified laterality         Plan:         Chronic eczematous otitis externa of left ear  -     levocetirizine (XYZAL) 5 MG tablet; Take 1 tablet (5 mg total) by mouth every evening.  -     betamethasone valerate 0.1% (VALISONE) 0.1 % Lotn; Apply topically 2 (two) times daily. for 14 days    Hearing loss, unspecified hearing loss type, unspecified laterality  -     Ambulatory referral/consult to Audiology; Future      Follow up if symptoms worsen or fail to improve.  Follow up for audiogram and further vestibular testing

## 2022-09-22 NOTE — PROCEDURES
Ear Cerumen Removal    Date/Time: 9/22/2022 8:30 AM  Performed by: Stef Yao PA-C  Authorized by: Stef Yao PA-C     Consent Done?:  Yes (Verbal)    Local anesthetic:  None  Location details:  Left ear  Procedure type: curette    Cerumen  Removal Results:  Cerumen completely removed  Patient tolerance:  Patient tolerated the procedure well with no immediate complications     Procedure Note:    The patient was brought to the minor procedure room and placed under the operating microscope of the left ear canal which was cleaned of ceruminous debris. Using a combination of suction, curettes and cup forceps the patient's cerumen impaction was removed. The tympanic membrane was evaluated and was unremarkable. The patient tolerated the procedure well. There were no complications.

## 2022-09-28 ENCOUNTER — OFFICE VISIT (OUTPATIENT)
Dept: GASTROENTEROLOGY | Facility: CLINIC | Age: 57
End: 2022-09-28
Payer: COMMERCIAL

## 2022-09-28 ENCOUNTER — LAB VISIT (OUTPATIENT)
Dept: LAB | Facility: HOSPITAL | Age: 57
End: 2022-09-28
Attending: INTERNAL MEDICINE
Payer: COMMERCIAL

## 2022-09-28 ENCOUNTER — CLINICAL SUPPORT (OUTPATIENT)
Dept: AUDIOLOGY | Facility: CLINIC | Age: 57
End: 2022-09-28
Payer: COMMERCIAL

## 2022-09-28 VITALS
BODY MASS INDEX: 34.49 KG/M2 | HEIGHT: 65 IN | DIASTOLIC BLOOD PRESSURE: 96 MMHG | SYSTOLIC BLOOD PRESSURE: 144 MMHG | WEIGHT: 207 LBS | HEART RATE: 69 BPM

## 2022-09-28 DIAGNOSIS — R19.4 CHANGE IN BOWEL HABIT: ICD-10-CM

## 2022-09-28 DIAGNOSIS — R19.4 CHANGE IN BOWEL HABIT: Primary | ICD-10-CM

## 2022-09-28 DIAGNOSIS — H91.90 HEARING LOSS, UNSPECIFIED HEARING LOSS TYPE, UNSPECIFIED LATERALITY: ICD-10-CM

## 2022-09-28 DIAGNOSIS — H93.8X9 SENSATION OF FULLNESS IN EAR, UNSPECIFIED LATERALITY: Primary | ICD-10-CM

## 2022-09-28 DIAGNOSIS — R19.8 CHANGE IN BOWEL FUNCTION: ICD-10-CM

## 2022-09-28 LAB
ALBUMIN SERPL BCP-MCNC: 4 G/DL (ref 3.5–5.2)
ALP SERPL-CCNC: 86 U/L (ref 55–135)
ALT SERPL W/O P-5'-P-CCNC: 12 U/L (ref 10–44)
ANION GAP SERPL CALC-SCNC: 9 MMOL/L (ref 8–16)
AST SERPL-CCNC: 15 U/L (ref 10–40)
BASOPHILS # BLD AUTO: 0.04 K/UL (ref 0–0.2)
BASOPHILS NFR BLD: 0.6 % (ref 0–1.9)
BILIRUB SERPL-MCNC: 0.5 MG/DL (ref 0.1–1)
BUN SERPL-MCNC: 15 MG/DL (ref 6–20)
CALCIUM SERPL-MCNC: 9.7 MG/DL (ref 8.7–10.5)
CHLORIDE SERPL-SCNC: 104 MMOL/L (ref 95–110)
CO2 SERPL-SCNC: 25 MMOL/L (ref 23–29)
CREAT SERPL-MCNC: 0.9 MG/DL (ref 0.5–1.4)
DIFFERENTIAL METHOD: NORMAL
EOSINOPHIL # BLD AUTO: 0.1 K/UL (ref 0–0.5)
EOSINOPHIL NFR BLD: 0.7 % (ref 0–8)
ERYTHROCYTE [DISTWIDTH] IN BLOOD BY AUTOMATED COUNT: 12.5 % (ref 11.5–14.5)
EST. GFR  (NO RACE VARIABLE): >60 ML/MIN/1.73 M^2
GLUCOSE SERPL-MCNC: 61 MG/DL (ref 70–110)
HCT VFR BLD AUTO: 41.7 % (ref 37–48.5)
HGB BLD-MCNC: 14 G/DL (ref 12–16)
IMM GRANULOCYTES # BLD AUTO: 0.03 K/UL (ref 0–0.04)
IMM GRANULOCYTES NFR BLD AUTO: 0.4 % (ref 0–0.5)
LYMPHOCYTES # BLD AUTO: 2 K/UL (ref 1–4.8)
LYMPHOCYTES NFR BLD: 29.9 % (ref 18–48)
MCH RBC QN AUTO: 30.8 PG (ref 27–31)
MCHC RBC AUTO-ENTMCNC: 33.6 G/DL (ref 32–36)
MCV RBC AUTO: 92 FL (ref 82–98)
MONOCYTES # BLD AUTO: 0.7 K/UL (ref 0.3–1)
MONOCYTES NFR BLD: 9.5 % (ref 4–15)
NEUTROPHILS # BLD AUTO: 4 K/UL (ref 1.8–7.7)
NEUTROPHILS NFR BLD: 58.9 % (ref 38–73)
NRBC BLD-RTO: 0 /100 WBC
PLATELET # BLD AUTO: 205 K/UL (ref 150–450)
PMV BLD AUTO: 10.9 FL (ref 9.2–12.9)
POTASSIUM SERPL-SCNC: 4.1 MMOL/L (ref 3.5–5.1)
PROT SERPL-MCNC: 7.5 G/DL (ref 6–8.4)
RBC # BLD AUTO: 4.54 M/UL (ref 4–5.4)
SODIUM SERPL-SCNC: 138 MMOL/L (ref 136–145)
T4 FREE SERPL-MCNC: 0.95 NG/DL (ref 0.71–1.51)
TSH SERPL DL<=0.005 MIU/L-ACNC: 1.29 UIU/ML (ref 0.4–4)
WBC # BLD AUTO: 6.82 K/UL (ref 3.9–12.7)

## 2022-09-28 PROCEDURE — 84443 ASSAY THYROID STIM HORMONE: CPT | Performed by: INTERNAL MEDICINE

## 2022-09-28 PROCEDURE — 1159F MED LIST DOCD IN RCRD: CPT | Mod: CPTII,S$GLB,, | Performed by: INTERNAL MEDICINE

## 2022-09-28 PROCEDURE — 36415 COLL VENOUS BLD VENIPUNCTURE: CPT | Performed by: INTERNAL MEDICINE

## 2022-09-28 PROCEDURE — 3008F PR BODY MASS INDEX (BMI) DOCUMENTED: ICD-10-PCS | Mod: CPTII,S$GLB,, | Performed by: INTERNAL MEDICINE

## 2022-09-28 PROCEDURE — 92567 TYMPANOMETRY: CPT | Mod: S$GLB,,,

## 2022-09-28 PROCEDURE — 99999 PR PBB SHADOW E&M-EST. PATIENT-LVL IV: CPT | Mod: PBBFAC,,, | Performed by: INTERNAL MEDICINE

## 2022-09-28 PROCEDURE — 3080F PR MOST RECENT DIASTOLIC BLOOD PRESSURE >= 90 MM HG: ICD-10-PCS | Mod: CPTII,S$GLB,, | Performed by: INTERNAL MEDICINE

## 2022-09-28 PROCEDURE — 99999 PR PBB SHADOW E&M-EST. PATIENT-LVL II: CPT | Mod: PBBFAC,,,

## 2022-09-28 PROCEDURE — 85025 COMPLETE CBC W/AUTO DIFF WBC: CPT | Performed by: INTERNAL MEDICINE

## 2022-09-28 PROCEDURE — 1159F PR MEDICATION LIST DOCUMENTED IN MEDICAL RECORD: ICD-10-PCS | Mod: CPTII,S$GLB,, | Performed by: INTERNAL MEDICINE

## 2022-09-28 PROCEDURE — 3077F PR MOST RECENT SYSTOLIC BLOOD PRESSURE >= 140 MM HG: ICD-10-PCS | Mod: CPTII,S$GLB,, | Performed by: INTERNAL MEDICINE

## 2022-09-28 PROCEDURE — 92557 COMPREHENSIVE HEARING TEST: CPT | Mod: S$GLB,,,

## 2022-09-28 PROCEDURE — 1160F RVW MEDS BY RX/DR IN RCRD: CPT | Mod: CPTII,S$GLB,, | Performed by: INTERNAL MEDICINE

## 2022-09-28 PROCEDURE — 92567 PR TYMPA2METRY: ICD-10-PCS | Mod: S$GLB,,,

## 2022-09-28 PROCEDURE — 99203 PR OFFICE/OUTPT VISIT, NEW, LEVL III, 30-44 MIN: ICD-10-PCS | Mod: S$GLB,,, | Performed by: INTERNAL MEDICINE

## 2022-09-28 PROCEDURE — 83516 IMMUNOASSAY NONANTIBODY: CPT | Performed by: INTERNAL MEDICINE

## 2022-09-28 PROCEDURE — 99999 PR PBB SHADOW E&M-EST. PATIENT-LVL IV: ICD-10-PCS | Mod: PBBFAC,,, | Performed by: INTERNAL MEDICINE

## 2022-09-28 PROCEDURE — 92557 PR COMPREHENSIVE HEARING TEST: ICD-10-PCS | Mod: S$GLB,,,

## 2022-09-28 PROCEDURE — 99203 OFFICE O/P NEW LOW 30 MIN: CPT | Mod: S$GLB,,, | Performed by: INTERNAL MEDICINE

## 2022-09-28 PROCEDURE — 3008F BODY MASS INDEX DOCD: CPT | Mod: CPTII,S$GLB,, | Performed by: INTERNAL MEDICINE

## 2022-09-28 PROCEDURE — 3080F DIAST BP >= 90 MM HG: CPT | Mod: CPTII,S$GLB,, | Performed by: INTERNAL MEDICINE

## 2022-09-28 PROCEDURE — 1160F PR REVIEW ALL MEDS BY PRESCRIBER/CLIN PHARMACIST DOCUMENTED: ICD-10-PCS | Mod: CPTII,S$GLB,, | Performed by: INTERNAL MEDICINE

## 2022-09-28 PROCEDURE — 80053 COMPREHEN METABOLIC PANEL: CPT | Performed by: INTERNAL MEDICINE

## 2022-09-28 PROCEDURE — 3077F SYST BP >= 140 MM HG: CPT | Mod: CPTII,S$GLB,, | Performed by: INTERNAL MEDICINE

## 2022-09-28 PROCEDURE — 99999 PR PBB SHADOW E&M-EST. PATIENT-LVL II: ICD-10-PCS | Mod: PBBFAC,,,

## 2022-09-28 PROCEDURE — 84439 ASSAY OF FREE THYROXINE: CPT | Performed by: INTERNAL MEDICINE

## 2022-09-28 NOTE — PROGRESS NOTES
Farzana Cunningham, a 57 y.o. female, was seen today in the clinic for an audiologic evaluation.  The patient's main complaint was aural fullness.  She noted she experienced ear pain while on a recent flight that has since resolved.  Ms. Cunningham reported she experiences hearing difficulty particularly when there are other things going on.  The patient also stated she gets a light headed sensation when she moves her head too fast and when she gets out of bed in the morning.  There were no reports of tinnitus.    Tympanometry revealed Type A in the right ear and Type A in the left ear.  Audiogram results revealed normal hearing sensitivity, bilaterally.  Speech reception thresholds were noted at 10 dB in the right ear and 10 dB in the left ear.  Speech discrimination scores were 100% in the right ear and 100% in the left ear.    Communication strategies including, having the person get your attention before communicating with you, sitting with the noise to your back at restaurants, being in the same room as the person who is talking to you, and facing the person who is talking to you, were discussed with the patient.      Recommendations:  Otologic evaluation  Repeat audiogram as needed  Hearing protection when in noise

## 2022-09-28 NOTE — Clinical Note
Augustin Trevino,   Here are the results of Ms. Cunningham's hearing evaluation. Please let me know if you need any further information or have any questions. Thanks!  Jimmy Connor

## 2022-09-28 NOTE — PROGRESS NOTES
Reason for visit: Change in bowel function    HPI:  is a 57 year old lady with change in bowel function. Has noted more soft stool and has post prandial urgency over the past 2-3 months. Has noted increased gassiness and foul odor. Denies any dysphagia, odynophagia, nausea, vomiting, heartburn or acid regurgitation. No abdominal pains, blood mucus in stool or unintentional weight loss. No melena or maroon stools. No recent changes in diet or medications. No family history of IBD, Celiac disease or GI malignancy. No regular NSAIDs, alcohol (drinks 2 glasses of wine every day to every other day) or tobacco use. No recent antibiotic use, travels or sick contacts. Has healthy pets (dogs). No prior history of C.diff. Uses Apple cider vinegar for digestive health. Colonoscopy done at an outside facility 3 years ago (no polyps, was asked to repeat in 10 years). No fecal urgency or incontinence.    Past medical, surgical, social and family history reviewed in epic    Medication allergies reviewed in epic    Review of systems:    Constitutional:  No fever, no chills, 40 lb weight gain over 2 years, appetite is normal; feels fatigued  Eyes:  No visual changes or red eyes  ENT:  No odynophagia or hoarseness of voice  Cardiovascular:  No angina or palpitation  Respiratory:  No shortness breath or wheezing  Genitourinary:  No dysuria or frequency; has noted nocturia  Musculoskeletal:  No myalgias or arthralgias  Skin:  No pruritus or eczema  Neurologic:  No headache or seizures; has insomnia  Psychiatric:  No anxiety depression  Gastrointestinal:  See HPI    Physical exam:  Vitals see epic, awake, alert, oriented x3 in no acute distress    Neck:  Supple, no carotid bruit, no cervical adenopathy  Abdomen:  Obese, soft, nontender, nondistended, no masses palpable, no hepatosplenomegaly detected, bowel sounds are normal, no ascites clinically detectable  Eyes:  Conjunctivae anicteric, not injected  ENT:  Oral mucosa  moist  Cardiovascular:  S1, S2 normal, no murmurs, no gallops, no abdominal bruits heard  Respiratory:  Bilateral air entry equal, no rhonchi, no crackles, normal effort  Skin:  No palmar erythema or spider angiomata  Neurologic:  No asterixis or tremors  Psychiatric:  Affect appropriate, proper judgment, proper insight, oriented to place and time  Lower extremities:  No pedal edema    No recent labs, imaging or endoscopy reports to review.      Impression: Change in bowel with post prandial urgency    Recommendations:     CBC, CMP, Free T4, TSH, Celiac antibodies  Stool studies

## 2022-10-03 ENCOUNTER — TELEPHONE (OUTPATIENT)
Dept: GASTROENTEROLOGY | Facility: CLINIC | Age: 57
End: 2022-10-03

## 2022-10-03 DIAGNOSIS — K85.90 ACUTE PANCREATITIS WITHOUT INFECTION OR NECROSIS, UNSPECIFIED PANCREATITIS TYPE: ICD-10-CM

## 2022-10-03 DIAGNOSIS — R10.10 PAIN OF UPPER ABDOMEN: Primary | ICD-10-CM

## 2022-10-03 LAB
GLIADIN PEPTIDE IGA SER-ACNC: 5 UNITS
GLIADIN PEPTIDE IGG SER-ACNC: 2 UNITS
IGA SERPL-MCNC: 209 MG/DL (ref 70–400)
TTG IGA SER-ACNC: 6 UNITS
TTG IGG SER-ACNC: 5 UNITS

## 2022-10-03 RX ORDER — PANCRELIPASE 24000; 76000; 120000 [USP'U]/1; [USP'U]/1; [USP'U]/1
2 CAPSULE, DELAYED RELEASE PELLETS ORAL
Qty: 180 CAPSULE | Refills: 11 | Status: SHIPPED | OUTPATIENT
Start: 2022-10-03 | End: 2023-08-29 | Stop reason: SDUPTHER

## 2022-10-03 NOTE — TELEPHONE ENCOUNTER
----- Message from Marcia Gusman MA sent at 10/3/2022 11:51 AM CDT -----    ----- Message -----  From: Tuan Mullins MD  Sent: 10/3/2022   7:28 AM CDT  To: [facility] Staff    Stool test shows decreased pancreas function. Cut back on alcohol. CT scan ordered. Also start taking 2 Creon capsules with each meal to help digest food. Prescription called in.

## 2022-10-07 ENCOUNTER — OFFICE VISIT (OUTPATIENT)
Dept: UROGYNECOLOGY | Facility: CLINIC | Age: 57
End: 2022-10-07
Payer: COMMERCIAL

## 2022-10-07 VITALS — DIASTOLIC BLOOD PRESSURE: 64 MMHG | BODY MASS INDEX: 34.45 KG/M2 | SYSTOLIC BLOOD PRESSURE: 114 MMHG | HEIGHT: 65 IN

## 2022-10-07 DIAGNOSIS — N39.46 MIXED STRESS AND URGE URINARY INCONTINENCE: ICD-10-CM

## 2022-10-07 PROCEDURE — 99214 OFFICE O/P EST MOD 30 MIN: CPT | Mod: S$GLB,,, | Performed by: OBSTETRICS & GYNECOLOGY

## 2022-10-07 PROCEDURE — 3008F PR BODY MASS INDEX (BMI) DOCUMENTED: ICD-10-PCS | Mod: CPTII,S$GLB,, | Performed by: OBSTETRICS & GYNECOLOGY

## 2022-10-07 PROCEDURE — 1159F PR MEDICATION LIST DOCUMENTED IN MEDICAL RECORD: ICD-10-PCS | Mod: CPTII,S$GLB,, | Performed by: OBSTETRICS & GYNECOLOGY

## 2022-10-07 PROCEDURE — 1159F MED LIST DOCD IN RCRD: CPT | Mod: CPTII,S$GLB,, | Performed by: OBSTETRICS & GYNECOLOGY

## 2022-10-07 PROCEDURE — 99999 PR PBB SHADOW E&M-EST. PATIENT-LVL III: ICD-10-PCS | Mod: PBBFAC,,, | Performed by: OBSTETRICS & GYNECOLOGY

## 2022-10-07 PROCEDURE — 3078F PR MOST RECENT DIASTOLIC BLOOD PRESSURE < 80 MM HG: ICD-10-PCS | Mod: CPTII,S$GLB,, | Performed by: OBSTETRICS & GYNECOLOGY

## 2022-10-07 PROCEDURE — 99999 PR PBB SHADOW E&M-EST. PATIENT-LVL III: CPT | Mod: PBBFAC,,, | Performed by: OBSTETRICS & GYNECOLOGY

## 2022-10-07 PROCEDURE — 3074F PR MOST RECENT SYSTOLIC BLOOD PRESSURE < 130 MM HG: ICD-10-PCS | Mod: CPTII,S$GLB,, | Performed by: OBSTETRICS & GYNECOLOGY

## 2022-10-07 PROCEDURE — 3074F SYST BP LT 130 MM HG: CPT | Mod: CPTII,S$GLB,, | Performed by: OBSTETRICS & GYNECOLOGY

## 2022-10-07 PROCEDURE — 99214 PR OFFICE/OUTPT VISIT, EST, LEVL IV, 30-39 MIN: ICD-10-PCS | Mod: S$GLB,,, | Performed by: OBSTETRICS & GYNECOLOGY

## 2022-10-07 PROCEDURE — 3008F BODY MASS INDEX DOCD: CPT | Mod: CPTII,S$GLB,, | Performed by: OBSTETRICS & GYNECOLOGY

## 2022-10-07 PROCEDURE — 3078F DIAST BP <80 MM HG: CPT | Mod: CPTII,S$GLB,, | Performed by: OBSTETRICS & GYNECOLOGY

## 2022-10-07 RX ORDER — DESMOPRESSIN ACETATE 0.1 MG/1
100 TABLET ORAL NIGHTLY
Qty: 30 TABLET | Refills: 0 | Status: SHIPPED | OUTPATIENT
Start: 2022-10-07 | End: 2022-11-05

## 2022-10-07 RX ORDER — HYDROXYZINE HYDROCHLORIDE 25 MG/1
25 TABLET, FILM COATED ORAL NIGHTLY
Qty: 30 TABLET | Refills: 3 | Status: SHIPPED | OUTPATIENT
Start: 2022-10-07 | End: 2022-11-06

## 2022-10-07 NOTE — PROGRESS NOTES
"Subjective:      Patient ID: Farzana Cunningham is a 57 y.o. female.    Chief Complaint:  Consult, Urinary Incontinence, and Urinary Urgency      History of Present Illness   57-year-old multipara who has an issue of 4-5 episodes of nocturia this is not a problem in the day occasionally there is incontinence patient is treatment naive and would like assistance          No past medical history on file.    Past Surgical History:   Procedure Laterality Date    BLADDER SURGERY         GYN & OB History  No LMP recorded.   Date of Last Pap: 2022    OB History    Para Term  AB Living   4         2   SAB IAB Ectopic Multiple Live Births                  # Outcome Date GA Lbr Mahad/2nd Weight Sex Delivery Anes PTL Lv   4             3             2             1                 Health Maintenance         Date Due Completion Date    Hepatitis C Screening Never done ---    Lipid Panel Never done ---    HIV Screening Never done ---    Shingles Vaccine (1 of 2) Never done ---    COVID-19 Vaccine (4 - Booster for Pfizer series) 2021    Influenza Vaccine (1) Never done ---    Mammogram 2023 3/8/2022    Cervical Cancer Screening 09/15/2025 9/15/2022    Colorectal Cancer Screening 11/15/2027 11/15/2017    TETANUS VACCINE 2030            No family history on file.    Social History     Socioeconomic History    Marital status:    Tobacco Use    Smoking status: Never    Smokeless tobacco: Never   Substance and Sexual Activity    Alcohol use: Yes     Comment: social    Drug use: Never       Review of Systems  Review of Systems   nocturia     Objective:   /64   Ht 5' 5" (1.651 m)   BMI 34.45 kg/m²     Physical Exam    Essentially completely normal anatomy  Assessment:     1. Mixed stress and urge urinary incontinence            Plan:     1. Mixed stress and urge urinary incontinence       Long discussion with patient after examination " office.  I would at this point I want to see if hydroxyzine plus desmopressin will do the trick she is sleeping much better now with melatonin however I am wondering if this is the direction to go as opposed to went away be medicine as this is not an issue during daylight hours I want to see this 1st if we do not have success I will then carry out urodynamics which I discussed with the patient in the after visit summary discussed with her patient appreciates the tear to approach medication is being sent and she will follow-up with a basic chemistry in 1 week patient noteunderstanding greatly appreciated the time spent  Patient Instructions

## 2023-04-25 ENCOUNTER — PATIENT MESSAGE (OUTPATIENT)
Dept: RESEARCH | Facility: HOSPITAL | Age: 58
End: 2023-04-25

## 2023-05-09 ENCOUNTER — PATIENT MESSAGE (OUTPATIENT)
Dept: RESEARCH | Facility: HOSPITAL | Age: 58
End: 2023-05-09

## 2023-08-29 ENCOUNTER — OFFICE VISIT (OUTPATIENT)
Dept: GASTROENTEROLOGY | Facility: CLINIC | Age: 58
End: 2023-08-29
Payer: COMMERCIAL

## 2023-08-29 ENCOUNTER — HOSPITAL ENCOUNTER (OUTPATIENT)
Dept: RADIOLOGY | Facility: OTHER | Age: 58
Discharge: HOME | End: 2023-08-29
Attending: INTERNAL MEDICINE
Payer: COMMERCIAL

## 2023-08-29 ENCOUNTER — TELEPHONE (OUTPATIENT)
Dept: GASTROENTEROLOGY | Facility: CLINIC | Age: 58
End: 2023-08-29

## 2023-08-29 VITALS
BODY MASS INDEX: 35.26 KG/M2 | HEIGHT: 65 IN | DIASTOLIC BLOOD PRESSURE: 86 MMHG | SYSTOLIC BLOOD PRESSURE: 123 MMHG | HEART RATE: 70 BPM | WEIGHT: 211.63 LBS

## 2023-08-29 DIAGNOSIS — R10.10 PAIN OF UPPER ABDOMEN: ICD-10-CM

## 2023-08-29 DIAGNOSIS — R10.10 PAIN OF UPPER ABDOMEN: Primary | ICD-10-CM

## 2023-08-29 PROCEDURE — 25500020 PHARM REV CODE 255: Performed by: INTERNAL MEDICINE

## 2023-08-29 PROCEDURE — 1159F PR MEDICATION LIST DOCUMENTED IN MEDICAL RECORD: ICD-10-PCS | Mod: CPTII,S$GLB,, | Performed by: INTERNAL MEDICINE

## 2023-08-29 PROCEDURE — 74177 CT ABD & PELVIS W/CONTRAST: CPT | Mod: TC

## 2023-08-29 PROCEDURE — 99214 OFFICE O/P EST MOD 30 MIN: CPT | Mod: S$GLB,,, | Performed by: INTERNAL MEDICINE

## 2023-08-29 PROCEDURE — 99999 PR PBB SHADOW E&M-EST. PATIENT-LVL III: CPT | Mod: PBBFAC,,, | Performed by: INTERNAL MEDICINE

## 2023-08-29 PROCEDURE — 3008F PR BODY MASS INDEX (BMI) DOCUMENTED: ICD-10-PCS | Mod: CPTII,S$GLB,, | Performed by: INTERNAL MEDICINE

## 2023-08-29 PROCEDURE — 3074F PR MOST RECENT SYSTOLIC BLOOD PRESSURE < 130 MM HG: ICD-10-PCS | Mod: CPTII,S$GLB,, | Performed by: INTERNAL MEDICINE

## 2023-08-29 PROCEDURE — 99999 PR PBB SHADOW E&M-EST. PATIENT-LVL III: ICD-10-PCS | Mod: PBBFAC,,, | Performed by: INTERNAL MEDICINE

## 2023-08-29 PROCEDURE — 99214 PR OFFICE/OUTPT VISIT, EST, LEVL IV, 30-39 MIN: ICD-10-PCS | Mod: S$GLB,,, | Performed by: INTERNAL MEDICINE

## 2023-08-29 PROCEDURE — 1159F MED LIST DOCD IN RCRD: CPT | Mod: CPTII,S$GLB,, | Performed by: INTERNAL MEDICINE

## 2023-08-29 PROCEDURE — 3008F BODY MASS INDEX DOCD: CPT | Mod: CPTII,S$GLB,, | Performed by: INTERNAL MEDICINE

## 2023-08-29 PROCEDURE — 3079F DIAST BP 80-89 MM HG: CPT | Mod: CPTII,S$GLB,, | Performed by: INTERNAL MEDICINE

## 2023-08-29 PROCEDURE — 74177 CT ABD & PELVIS W/CONTRAST: CPT | Mod: 26,,, | Performed by: RADIOLOGY

## 2023-08-29 PROCEDURE — 3074F SYST BP LT 130 MM HG: CPT | Mod: CPTII,S$GLB,, | Performed by: INTERNAL MEDICINE

## 2023-08-29 PROCEDURE — 3079F PR MOST RECENT DIASTOLIC BLOOD PRESSURE 80-89 MM HG: ICD-10-PCS | Mod: CPTII,S$GLB,, | Performed by: INTERNAL MEDICINE

## 2023-08-29 PROCEDURE — 74177 CT ABDOMEN PELVIS WITH CONTRAST: ICD-10-PCS | Mod: 26,,, | Performed by: RADIOLOGY

## 2023-08-29 RX ORDER — PANCRELIPASE 24000; 76000; 120000 [USP'U]/1; [USP'U]/1; [USP'U]/1
2 CAPSULE, DELAYED RELEASE PELLETS ORAL
Qty: 180 CAPSULE | Refills: 11 | Status: SHIPPED | OUTPATIENT
Start: 2023-08-29 | End: 2024-08-28

## 2023-08-29 RX ADMIN — IOHEXOL 100 ML: 350 INJECTION, SOLUTION INTRAVENOUS at 01:08

## 2023-08-29 NOTE — TELEPHONE ENCOUNTER
----- Message from Tuan Mullins MD sent at 8/29/2023  1:52 PM CDT -----  Pancreas looks normal on CT scan. Will wait on the stool test to check the functioning of pancreas.

## 2023-08-29 NOTE — PROGRESS NOTES
Reason for visit: Change in bowel function     HPI:  is a 58 year old lady last seen a year ago for change in bowel function. Had noted more soft stool with post prandial urgency over the prior 2-3 months. Had also noted increased gassiness and foul odor. Denied any dysphagia, odynophagia, nausea, vomiting, heartburn or acid regurgitation. Mild upper abdominal pains. No blood mucus in stool or unintentional weight loss. No melena or maroon stools. No recent changes in diet or medications. No family history of IBD, Celiac disease or GI malignancy. No regular NSAIDs, alcohol (drinks 2 glasses of wine every day to every other day) or tobacco use. No recent antibiotic use, travels or sick contacts. Has healthy pets (dogs). No prior history of C.diff. Uses Apple cider vinegar for digestive health. Colonoscopy done at an outside facility 3 years ago (no polyps, was asked to repeat in 10 years). No fecal urgency or incontinence.    Subsequent stool tests revealed decreased pancreas function. She was asked to cut back on alcohol. CT scan ordered but not scheduled. A new prescription for 2 Creon capsules with each meal was recommended to help digest food. She had not started the prescription. Has been drinking seltzer daily instead of wine. She continues to get nauseated with food and has early satiety. Has also been noticing nocturnal reflux.       Past medical, surgical, social and family history reviewed in epic     Medication allergies reviewed in epic     Review of systems:     Constitutional:  No fever, no chills, weight gain over 2 years, appetite is normal; feels fatigued; has hot flashes  Eyes:  No visual changes or red eyes  ENT:  No odynophagia or hoarseness of voice  Cardiovascular:  No angina or palpitation  Respiratory:  No shortness breath or wheezing  Genitourinary:  No dysuria or frequency; has noted nocturia  Musculoskeletal:  No myalgias or arthralgias  Skin:  No pruritus or eczema  Neurologic:   No headache or seizures; has insomnia  Psychiatric:  No anxiety depression  Gastrointestinal:  See HPI     Physical exam:  Vitals see epic, awake, alert, oriented x3 in no acute distress     No physical exam done today.     Prior labs reviewed. No recent imaging or endoscopy reports to review.         Impression: Change in bowel with post prandial urgency and upper abdominal discomfort.     Recommendations:      Complete abstinence from alcohol  Schedule CT scan of the abdomen with iv contrast  Repeat Stool pancreatic elastase  Start Creon 24; 2 capsules daily  Schedule EGD and Colonoscopy

## 2023-08-30 ENCOUNTER — TELEPHONE (OUTPATIENT)
Dept: ENDOSCOPY | Facility: HOSPITAL | Age: 58
End: 2023-08-30
Payer: COMMERCIAL

## 2023-08-30 NOTE — TELEPHONE ENCOUNTER
"    Endo Case Request  Received: Yesterday  Tuan Mullins MD Piglia, Ashley, JABARI  Procedure: EGD/Colonoscopy     Diagnosis: Diarrhea     Procedure Timin-4 weeks     *If within 4 weeks selected, please jono as high priority*     *If greater than 12 weeks, please select "4-12 weeks" and delay sending until 2 months prior to requested date*     Provider: Myself     Location: [facility]     Additional Scheduling Information: No scheduling concerns     Prep Specifications:Standard prep     Have you attached a patient to this message: Yes    "

## 2023-08-31 ENCOUNTER — LAB VISIT (OUTPATIENT)
Dept: LAB | Facility: HOSPITAL | Age: 58
End: 2023-08-31
Attending: INTERNAL MEDICINE
Payer: COMMERCIAL

## 2023-08-31 DIAGNOSIS — R10.10 PAIN OF UPPER ABDOMEN: ICD-10-CM

## 2023-08-31 PROCEDURE — 82653 EL-1 FECAL QUANTITATIVE: CPT | Performed by: INTERNAL MEDICINE

## 2023-09-01 ENCOUNTER — TELEPHONE (OUTPATIENT)
Dept: GASTROENTEROLOGY | Facility: CLINIC | Age: 58
End: 2023-09-01

## 2023-09-01 NOTE — TELEPHONE ENCOUNTER
----- Message from Jesús Pulido sent at 9/1/2023 10:45 AM CDT -----  Regarding: Question about medication  Contact: [phone]  Hi, pt called to spk with the doctor to discuss the medication he prescribed at the price of $700. Pls call the pt [phone] to discuss other medication options.

## 2023-09-05 LAB — ELASTASE 1, FECAL: 214 MCG/G

## 2023-09-06 ENCOUNTER — TELEPHONE (OUTPATIENT)
Dept: GASTROENTEROLOGY | Facility: CLINIC | Age: 58
End: 2023-09-06

## 2023-09-06 DIAGNOSIS — R19.7 DIARRHEA, UNSPECIFIED TYPE: Primary | ICD-10-CM

## 2023-09-06 NOTE — TELEPHONE ENCOUNTER
Ma sent patient message stating that when the provider goes over test he will contact patient to go over test results

## 2023-09-06 NOTE — PROGRESS NOTES
"Spoke with  today and discussed the stool test results (normal pancreatic elastase) and CT scan result. Continues to have nausea (no vomiting) and 'yellow stools". Will proceed with EGD and colonoscopy with biopsies."

## 2023-09-08 ENCOUNTER — ANESTHESIA EVENT (OUTPATIENT)
Dept: ENDOSCOPY | Facility: HOSPITAL | Age: 58
End: 2023-09-08
Payer: COMMERCIAL

## 2023-09-08 NOTE — ANESTHESIA PREPROCEDURE EVALUATION
09/08/2023  Farzana Cunningham is a 58 y.o., female.      Pre-op Assessment          Review of Systems         Anesthesia Plan  Type of Anesthesia, risks & benefits discussed:    Anesthesia Type: Gen Natural Airway  Intra-op Monitoring Plan: Standard ASA Monitors  Informed Consent: Patient consented to blood products? No  ASA Score: 2  Day of Surgery Review of History & Physical: H&P Update referred to the surgeon/provider.    Ready For Surgery From Anesthesia Perspective.     .    [facility]  Anesthesia Pre-Operative Evaluation       Patient Name: Farzana Cunningham  YOB: 1965  MRN: [id number]  CSN: [id number]      Code Status: No Order   Date of Procedure: 9/12/2023  Anesthesia: Choice Procedure: Procedure(s) (LRB):  EGD (ESOPHAGOGASTRODUODENOSCOPY) (N/A)  COLONOSCOPY (N/A)  Pre-Operative Diagnosis: Diarrhea, unspecified type [R19.7]  Proceduralist: Surgeon(s) and Role:     * Tuan Mullins MD - Primary Nurse: (Unknown)      SUBJECTIVE:   Farzana Cunningham is a 58 y.o. female who  has no past medical history on file..     she has a current medication list which includes the following long-term medication(s): betamethasone valerate 0.1%, desmopressin, estradiol, levocetirizine, and creon.     ALLERGIES:   Review of patient's allergies indicates:  No Known Allergies  LDA:          Lines/Drains/Airways     None               Anesthesia Evaluation     MEDICATIONS:     Antibiotics (From admission, onward)    None        VTE Risk Mitigation (From admission, onward)    None            No current facility-administered medications for this encounter.     Current Outpatient Medications   Medication Sig Dispense Refill    betamethasone valerate 0.1% (VALISONE) 0.1 % Lotn Apply topically 2 (two) times daily. for 14 days 60 mL 2    desmopressin (DDAVP) 0.1 MG Tab Take 1 tablet (100 mcg total)  "by mouth every evening. for 30 doses 30 tablet 0    estradioL (ESTRACE) 0.01 % (0.1 mg/gram) vaginal cream Place 1 g vaginally once daily. for 365 doses 45 g 12    levocetirizine (XYZAL) 5 MG tablet Take 1 tablet (5 mg total) by mouth every evening. 30 tablet 2    lipase-protease-amylase 24,000-76,000-120,000 units (CREON) 24,000-76,000 -120,000 unit capsule Take 2 capsules by mouth 3 (three) times daily with meals. 180 capsule 11    methylPREDNISolone (MEDROL DOSEPACK) 4 mg tablet use as directed 1 Package 0          History:   There are no hospital problems to display for this patient.    Surgical History:    has a past surgical history that includes Bladder surgery.   Social History:    has no history on file for sexual activity.  reports that she has never smoked. She has never used smokeless tobacco. She reports current alcohol use. She reports that she does not use drugs.     OBJECTIVE:     Vital Signs (Most Recent):    Vital Signs Range (Last 24H):  BP: ()/()   Arterial Line BP: ()/()        There is no height or weight on file to calculate BMI.   Wt Readings from Last 4 Encounters:   08/29/23 96 kg (211 lb 10.3 oz)   09/28/22 93.9 kg (207 lb)   09/15/22 93.1 kg (205 lb 4 oz)   10/08/20 83.9 kg (184 lb 15.5 oz)       Significant Labs:  Lab Results   Component Value Date    WBC 6.82 09/28/2022    HGB 14.0 09/28/2022    HCT 41.7 09/28/2022     09/28/2022     09/28/2022    K 4.1 09/28/2022     09/28/2022    CREATININE 0.9 09/28/2022    BUN 15 09/28/2022    CO2 25 09/28/2022    GLU 61 (L) 09/28/2022    CALCIUM 9.7 09/28/2022    ALKPHOS 86 09/28/2022    ALT 12 09/28/2022    AST 15 09/28/2022    ALBUMIN 4.0 09/28/2022     No LMP recorded.  No results found for this or any previous visit (from the past 72 hour(s)).    EKG:   No results found for this or any previous visit.    TTE:  No results found for this or any previous visit.  No results found for: "EF"   No results found for this or any " "previous visit.  NICOLAS:  No results found for this or any previous visit.  Stress Test:  No results found for this or any previous visit.     LHC:  No results found for this or any previous visit.     PFT:  No results found for: "FEV1", "FVC", "NVI0RJW", "TLC", "DLCO"     ASSESSMENT/PLAN:     "

## 2023-09-12 ENCOUNTER — HOSPITAL ENCOUNTER (OUTPATIENT)
Facility: HOSPITAL | Age: 58
Discharge: HOME | End: 2023-09-12
Attending: INTERNAL MEDICINE | Admitting: INTERNAL MEDICINE
Payer: COMMERCIAL

## 2023-09-12 ENCOUNTER — ANESTHESIA (OUTPATIENT)
Dept: ENDOSCOPY | Facility: HOSPITAL | Age: 58
End: 2023-09-12
Payer: COMMERCIAL

## 2023-09-12 VITALS
HEIGHT: 65 IN | TEMPERATURE: 98 F | OXYGEN SATURATION: 96 % | RESPIRATION RATE: 18 BRPM | HEART RATE: 81 BPM | WEIGHT: 211 LBS | SYSTOLIC BLOOD PRESSURE: 144 MMHG | BODY MASS INDEX: 35.16 KG/M2 | DIASTOLIC BLOOD PRESSURE: 82 MMHG

## 2023-09-12 DIAGNOSIS — R19.5 LOOSE STOOLS: ICD-10-CM

## 2023-09-12 DIAGNOSIS — R10.10 UPPER ABDOMINAL PAIN: Primary | ICD-10-CM

## 2023-09-12 PROCEDURE — 43239 EGD BIOPSY SINGLE/MULTIPLE: CPT | Performed by: INTERNAL MEDICINE

## 2023-09-12 PROCEDURE — 25000003 PHARM REV CODE 250: Performed by: UROLOGY

## 2023-09-12 PROCEDURE — 88342 CHG IMMUNOCYTOCHEMISTRY: ICD-10-PCS | Mod: 26,,, | Performed by: STUDENT IN AN ORGANIZED HEALTH CARE EDUCATION/TRAINING PROGRAM

## 2023-09-12 PROCEDURE — 88305 TISSUE EXAM BY PATHOLOGIST: ICD-10-PCS | Mod: 26,,, | Performed by: STUDENT IN AN ORGANIZED HEALTH CARE EDUCATION/TRAINING PROGRAM

## 2023-09-12 PROCEDURE — 43239 EGD BIOPSY SINGLE/MULTIPLE: CPT | Mod: 51,,, | Performed by: INTERNAL MEDICINE

## 2023-09-12 PROCEDURE — D9220A PRA ANESTHESIA: Mod: ,,, | Performed by: NURSE ANESTHETIST, CERTIFIED REGISTERED

## 2023-09-12 PROCEDURE — 45380 COLONOSCOPY AND BIOPSY: CPT | Mod: ,,, | Performed by: INTERNAL MEDICINE

## 2023-09-12 PROCEDURE — D9220A PRA ANESTHESIA: ICD-10-PCS | Mod: ,,, | Performed by: NURSE ANESTHETIST, CERTIFIED REGISTERED

## 2023-09-12 PROCEDURE — 82657 ENZYME CELL ACTIVITY: CPT | Performed by: PATHOLOGY

## 2023-09-12 PROCEDURE — 88305 TISSUE EXAM BY PATHOLOGIST: CPT | Mod: 59 | Performed by: STUDENT IN AN ORGANIZED HEALTH CARE EDUCATION/TRAINING PROGRAM

## 2023-09-12 PROCEDURE — 45380 COLONOSCOPY AND BIOPSY: CPT | Performed by: INTERNAL MEDICINE

## 2023-09-12 PROCEDURE — 45380 PR COLONOSCOPY,BIOPSY: ICD-10-PCS | Mod: ,,, | Performed by: INTERNAL MEDICINE

## 2023-09-12 PROCEDURE — 37000009 HC ANESTHESIA EA ADD 15 MINS: Performed by: INTERNAL MEDICINE

## 2023-09-12 PROCEDURE — 94761 N-INVAS EAR/PLS OXIMETRY MLT: CPT

## 2023-09-12 PROCEDURE — 88305 TISSUE EXAM BY PATHOLOGIST: CPT | Mod: 26,,, | Performed by: STUDENT IN AN ORGANIZED HEALTH CARE EDUCATION/TRAINING PROGRAM

## 2023-09-12 PROCEDURE — 43239 PR EGD, FLEX, W/BIOPSY, SGL/MULTI: ICD-10-PCS | Mod: 51,,, | Performed by: INTERNAL MEDICINE

## 2023-09-12 PROCEDURE — 88342 IMHCHEM/IMCYTCHM 1ST ANTB: CPT | Performed by: STUDENT IN AN ORGANIZED HEALTH CARE EDUCATION/TRAINING PROGRAM

## 2023-09-12 PROCEDURE — 37000008 HC ANESTHESIA 1ST 15 MINUTES: Performed by: INTERNAL MEDICINE

## 2023-09-12 PROCEDURE — 99900035 HC TECH TIME PER 15 MIN (STAT)

## 2023-09-12 PROCEDURE — 27201012 HC FORCEPS, HOT/COLD, DISP: Performed by: INTERNAL MEDICINE

## 2023-09-12 PROCEDURE — 88342 IMHCHEM/IMCYTCHM 1ST ANTB: CPT | Mod: 26,,, | Performed by: STUDENT IN AN ORGANIZED HEALTH CARE EDUCATION/TRAINING PROGRAM

## 2023-09-12 RX ORDER — SODIUM CHLORIDE 9 MG/ML
INJECTION, SOLUTION INTRAVENOUS CONTINUOUS
Status: DISCONTINUED | OUTPATIENT
Start: 2023-09-12 | End: 2023-09-12 | Stop reason: HOSPADM

## 2023-09-12 RX ORDER — LIDOCAINE HYDROCHLORIDE 20 MG/ML
INJECTION, SOLUTION EPIDURAL; INFILTRATION; INTRACAUDAL; PERINEURAL
Status: DISCONTINUED | OUTPATIENT
Start: 2023-09-12 | End: 2023-09-12

## 2023-09-12 RX ORDER — KETOROLAC TROMETHAMINE 30 MG/ML
15 INJECTION, SOLUTION INTRAMUSCULAR; INTRAVENOUS EVERY 8 HOURS PRN
Status: CANCELLED | OUTPATIENT
Start: 2023-09-12 | End: 2023-09-14

## 2023-09-12 RX ORDER — FENTANYL CITRATE 50 UG/ML
25 INJECTION, SOLUTION INTRAMUSCULAR; INTRAVENOUS EVERY 5 MIN PRN
Status: CANCELLED | OUTPATIENT
Start: 2023-09-12

## 2023-09-12 RX ORDER — PROPOFOL 10 MG/ML
VIAL (ML) INTRAVENOUS
Status: DISCONTINUED | OUTPATIENT
Start: 2023-09-12 | End: 2023-09-12

## 2023-09-12 RX ORDER — DEXMEDETOMIDINE HYDROCHLORIDE 100 UG/ML
INJECTION, SOLUTION INTRAVENOUS
Status: DISCONTINUED | OUTPATIENT
Start: 2023-09-12 | End: 2023-09-12

## 2023-09-12 RX ORDER — PROCHLORPERAZINE EDISYLATE 5 MG/ML
5 INJECTION INTRAMUSCULAR; INTRAVENOUS EVERY 30 MIN PRN
Status: CANCELLED | OUTPATIENT
Start: 2023-09-12

## 2023-09-12 RX ORDER — ONDANSETRON 2 MG/ML
4 INJECTION INTRAMUSCULAR; INTRAVENOUS DAILY PRN
Status: CANCELLED | OUTPATIENT
Start: 2023-09-12

## 2023-09-12 RX ADMIN — GLYCOPYRROLATE 0.2 MG: 0.2 INJECTION, SOLUTION INTRAMUSCULAR; INTRAVENOUS at 01:09

## 2023-09-12 RX ADMIN — Medication 80 MG: at 01:09

## 2023-09-12 RX ADMIN — Medication 175 MCG/KG/MIN: at 01:09

## 2023-09-12 RX ADMIN — DEXMEDETOMIDINE HYDROCHLORIDE 8 MCG: 100 INJECTION, SOLUTION INTRAVENOUS at 01:09

## 2023-09-12 RX ADMIN — Medication 40 MG: at 01:09

## 2023-09-12 RX ADMIN — SODIUM CHLORIDE: 9 INJECTION, SOLUTION INTRAVENOUS at 01:09

## 2023-09-12 RX ADMIN — LIDOCAINE HYDROCHLORIDE 100 MG: 20 INJECTION, SOLUTION EPIDURAL; INFILTRATION; INTRACAUDAL; PERINEURAL at 01:09

## 2023-09-12 NOTE — TRANSFER OF CARE
"Anesthesia Transfer of Care Note    Patient: Farzana Cunningham    Procedure(s) Performed: Procedure(s) (LRB):  EGD (ESOPHAGOGASTRODUODENOSCOPY) (N/A)  COLONOSCOPY (N/A)    Patient location: GI    Anesthesia Type: general    Transport from OR: Transported from OR on room air with adequate spontaneous ventilation    Post pain: adequate analgesia    Post assessment: no apparent anesthetic complications    Post vital signs: stable    Level of consciousness: awake, alert and oriented    Nausea/Vomiting: no nausea/vomiting    Complications: none    Transfer of care protocol was followed      Last vitals:   Visit Vitals  /88 (BP Location: Left arm, Patient Position: Lying)   Pulse 99   Temp 36.5 °C (97.7 °F) (Temporal)   Resp 14   Ht 5' 5" (1.651 m)   Wt 95.7 kg (211 lb)   LMP 09/30/2020   SpO2 99%   Breastfeeding No   BMI 35.11 kg/m²     "

## 2023-09-12 NOTE — H&P
Short Stay Endoscopy History and Physical    PCP - Les Box MD    Procedure - EGD/Colonoscopy  Sedation: GA  ASA - per anesthesia  Mallampati - per anesthesia  History of Anesthesia problems - no  Family history Anesthesia problems -  no     HPI:  This is a 58 y.o. female here for evaluation of : Upper abdominal pain and loose stools    Reflux - no  Dysphagia - no  Abdominal pain - yes  Diarrhea - yes    ROS:  Constitutional: No fevers, chills, No weight loss  ENT: No allergies  CV: No chest pain  Pulm: No cough, No shortness of breath  Ophtho: No vision changes  GI: see HPI  Medical History:  has no past medical history on file.    Surgical History:  has a past surgical history that includes Bladder surgery.    Family History: family history is not on file.. Otherwise no colon cancer, inflammatory bowel disease, or GI malignancies.    Social History:  reports that she has never smoked. She has never used smokeless tobacco. She reports current alcohol use. She reports that she does not use drugs.    Review of patient's allergies indicates:   Allergen Reactions    Juan Rash       Medications:   Medications Prior to Admission   Medication Sig Dispense Refill Last Dose    betamethasone valerate 0.1% (VALISONE) 0.1 % Lotn Apply topically 2 (two) times daily. for 14 days 60 mL 2     desmopressin (DDAVP) 0.1 MG Tab Take 1 tablet (100 mcg total) by mouth every evening. for 30 doses 30 tablet 0     estradioL (ESTRACE) 0.01 % (0.1 mg/gram) vaginal cream Place 1 g vaginally once daily. for 365 doses 45 g 12     levocetirizine (XYZAL) 5 MG tablet Take 1 tablet (5 mg total) by mouth every evening. 30 tablet 2     lipase-protease-amylase 24,000-76,000-120,000 units (CREON) 24,000-76,000 -120,000 unit capsule Take 2 capsules by mouth 3 (three) times daily with meals. 180 capsule 11     methylPREDNISolone (MEDROL DOSEPACK) 4 mg tablet use as directed 1 Package 0        Objective Findings:    Vital Signs: Per nursing  notes.    Physical Exam:  General Appearance: Well appearing in no acute distress  Head:   Normocephalic, without obvious abnormality  Eyes:    No scleral icterus  Airway: Open  Neck: No restriction in mobility  Lungs: CTA bilaterally in anterior and posterior fields, no wheezes, no crackles.  Heart:  Regular rate and rhythm, S1, S2 normal, no murmurs heard  Abdomen: Soft, non tender, non distended      Labs:  Lab Results   Component Value Date    WBC 6.82 09/28/2022    HGB 14.0 09/28/2022    HCT 41.7 09/28/2022     09/28/2022    ALT 12 09/28/2022    AST 15 09/28/2022     09/28/2022    K 4.1 09/28/2022     09/28/2022    CREATININE 0.9 09/28/2022    BUN 15 09/28/2022    CO2 25 09/28/2022    TSH 1.291 09/28/2022         I have explained the risks and benefits of endoscopy procedures to the patient including but not limited to bleeding, perforation, infection, and death.    Thank you so much for allowing me to participate in the care of Farzana Cunningham    Tuan Mullins MD

## 2023-09-12 NOTE — PROVATION PATIENT INSTRUCTIONS
Discharge Summary/Instructions after an Endoscopic Procedure  Patient Name: Farzana Cunningham  Patient MRN: [id number]  Patient YOB: 1965  Tuesday, September 12, 2023  Tuan Mullins MD  Dear patient,  As a result of recent federal legislation (The Federal Cures Act), you may   receive lab or pathology results from your procedure in your [facility]   account before your physician is able to contact you. Your physician or   their representative will relay the results to you with their   recommendations at their soonest availability.  Thank you,  RESTRICTIONS:  During your procedure today, you received medications for sedation.  These   medications may affect your judgment, balance and coordination.  Therefore,   for 24 hours, you have the following restrictions:   - DO NOT drive a car, operate machinery, make legal/financial decisions,   sign important papers or drink alcohol.    ACTIVITY:  Today: no heavy lifting, straining or running due to procedural   sedation/anesthesia.  The following day: return to full activity including work.  DIET:  Eat and drink normally unless instructed otherwise.     TREATMENT FOR COMMON SIDE EFFECTS:  - Mild abdominal pain, nausea, belching, bloating or excessive gas:  rest,   eat lightly and use a heating pad.  - Sore Throat: treat with throat lozenges and/or gargle with warm salt   water.  - Because air was used during the procedure, expelling large amounts of air   from your rectum or belching is normal.  - If a bowel prep was taken, you may not have a bowel movement for 1-3 days.    This is normal.  SYMPTOMS TO WATCH FOR AND REPORT TO YOUR PHYSICIAN:  1. Abdominal pain or bloating, other than gas cramps.  2. Chest pain.  3. Back pain.  4. Signs of infection such as: chills or fever occurring within 24 hours   after the procedure.  5. Rectal bleeding, which would show as bright red, maroon, or black stools.   (A tablespoon of blood from the rectum is not serious, especially  if   hemorrhoids are present.)  6. Vomiting.  7. Weakness or dizziness.  GO DIRECTLY TO THE NEAREST EMERGENCY ROOM IF YOU HAVE ANY OF THE FOLLOWING:      Difficulty breathing              Chills and/or fever over 101 F   Persistent vomiting and/or vomiting blood   Severe abdominal pain   Severe chest pain   Black, tarry stools   Bleeding- more than one tablespoon   Any other symptom or condition that you feel may need urgent attention  Your doctor recommends these additional instructions:  If any biopsies were taken, your doctors clinic will contact you in 1 to 2   weeks with any results.  - Patient has a contact number available for emergencies.  The signs and   symptoms of potential delayed complications were discussed with the   patient.  Return to normal activities tomorrow.  Written discharge   instructions were provided to the patient.   - Discharge patient to home.   - Resume previous diet.   - Continue present medications.   - Await pathology results.   - Repeat colonoscopy in 10 years for screening purposes.  For questions, problems or results please call your physician - Tuan Mullins MD at Work:  [phone].  [facility], EMERGENCY ROOM PHONE NUMBER: [phone]  IF A COMPLICATION OR EMERGENCY SITUATION ARISES AND YOU ARE UNABLE TO REACH   YOUR PHYSICIAN - GO DIRECTLY TO THE EMERGENCY ROOM.  Tuan Mullins MD  9/12/2023 2:09:58 PM  This report has been verified and signed electronically.  Dear patient,  As a result of recent federal legislation (The Federal Cures Act), you may   receive lab or pathology results from your procedure in your [facility]   account before your physician is able to contact you. Your physician or   their representative will relay the results to you with their   recommendations at their soonest availability.  Thank you,  PROVATION

## 2023-09-12 NOTE — ANESTHESIA POSTPROCEDURE EVALUATION
Anesthesia Post Evaluation    Patient: Farzana Cunningham    Procedure(s) Performed: Procedure(s) (LRB):  EGD (ESOPHAGOGASTRODUODENOSCOPY) (N/A)  COLONOSCOPY (N/A)    Final Anesthesia Type: general      Patient location during evaluation: GI PACU  Patient participation: Yes- Able to Participate  Level of consciousness: awake and alert and oriented  Post-procedure vital signs: reviewed and stable  Pain management: adequate  Airway patency: patent    PONV status at discharge: No PONV  Anesthetic complications: no      Cardiovascular status: hemodynamically stable  Respiratory status: unassisted  Hydration status: euvolemic  Follow-up not needed.          Vitals Value Taken Time   /59 09/12/23 1413   Temp 36.4 °C (97.5 °F) 09/12/23 1412   Pulse 89 09/12/23 1416   Resp 16 09/12/23 1416   SpO2 93 % 09/12/23 1416   Vitals shown include unvalidated device data.      No case tracking events are documented in the log.      Pain/Pop Score: No data recorded

## 2023-09-12 NOTE — PROVATION PATIENT INSTRUCTIONS
Discharge Summary/Instructions after an Endoscopic Procedure  Patient Name: Farzana Cunningham  Patient MRN: [id number]  Patient YOB: 1965  Tuesday, September 12, 2023  Tuan Mullins MD  Dear patient,  As a result of recent federal legislation (The Federal Cures Act), you may   receive lab or pathology results from your procedure in your [facility]   account before your physician is able to contact you. Your physician or   their representative will relay the results to you with their   recommendations at their soonest availability.  Thank you,  RESTRICTIONS:  During your procedure today, you received medications for sedation.  These   medications may affect your judgment, balance and coordination.  Therefore,   for 24 hours, you have the following restrictions:   - DO NOT drive a car, operate machinery, make legal/financial decisions,   sign important papers or drink alcohol.    ACTIVITY:  Today: no heavy lifting, straining or running due to procedural   sedation/anesthesia.  The following day: return to full activity including work.  DIET:  Eat and drink normally unless instructed otherwise.     TREATMENT FOR COMMON SIDE EFFECTS:  - Mild abdominal pain, nausea, belching, bloating or excessive gas:  rest,   eat lightly and use a heating pad.  - Sore Throat: treat with throat lozenges and/or gargle with warm salt   water.  - Because air was used during the procedure, expelling large amounts of air   from your rectum or belching is normal.  - If a bowel prep was taken, you may not have a bowel movement for 1-3 days.    This is normal.  SYMPTOMS TO WATCH FOR AND REPORT TO YOUR PHYSICIAN:  1. Abdominal pain or bloating, other than gas cramps.  2. Chest pain.  3. Back pain.  4. Signs of infection such as: chills or fever occurring within 24 hours   after the procedure.  5. Rectal bleeding, which would show as bright red, maroon, or black stools.   (A tablespoon of blood from the rectum is not serious, especially  if   hemorrhoids are present.)  6. Vomiting.  7. Weakness or dizziness.  GO DIRECTLY TO THE NEAREST EMERGENCY ROOM IF YOU HAVE ANY OF THE FOLLOWING:      Difficulty breathing              Chills and/or fever over 101 F   Persistent vomiting and/or vomiting blood   Severe abdominal pain   Severe chest pain   Black, tarry stools   Bleeding- more than one tablespoon   Any other symptom or condition that you feel may need urgent attention  Your doctor recommends these additional instructions:  If any biopsies were taken, your doctors clinic will contact you in 1 to 2   weeks with any results.  - Patient has a contact number available for emergencies.  The signs and   symptoms of potential delayed complications were discussed with the   patient.  Return to normal activities tomorrow.  Written discharge   instructions were provided to the patient.   - Discharge patient to home.   - Resume previous diet.   - Continue present medications.   - Await pathology results.   For questions, problems or results please call your physician - Tuan Mullins MD at Work:  [phone].  [facility], EMERGENCY ROOM PHONE NUMBER: [phone]  IF A COMPLICATION OR EMERGENCY SITUATION ARISES AND YOU ARE UNABLE TO REACH   YOUR PHYSICIAN - GO DIRECTLY TO THE EMERGENCY ROOM.  Tuan Mullins MD  9/12/2023 1:56:24 PM  This report has been verified and signed electronically.  Dear patient,  As a result of recent federal legislation (The Federal Cures Act), you may   receive lab or pathology results from your procedure in your [facility]   account before your physician is able to contact you. Your physician or   their representative will relay the results to you with their   recommendations at their soonest availability.  Thank you,  PROVATION

## 2023-09-18 LAB
FINAL PATHOLOGIC DIAGNOSIS: NORMAL
Lab: NORMAL

## 2023-09-21 LAB
FINAL PATHOLOGIC DIAGNOSIS: NORMAL
Lab: NORMAL

## 2023-10-03 ENCOUNTER — TELEPHONE (OUTPATIENT)
Dept: GASTROENTEROLOGY | Facility: CLINIC | Age: 58
End: 2023-10-03

## 2023-10-03 NOTE — TELEPHONE ENCOUNTER
----- Message from Tuan Mullins MD sent at 10/2/2023  4:29 PM CDT -----  Minimal inflammation in the stomach on biopsies. Nothing to worry. Continue to stay off alcohol.

## 2023-10-04 ENCOUNTER — TELEPHONE (OUTPATIENT)
Dept: GASTROENTEROLOGY | Facility: CLINIC | Age: 58
End: 2023-10-04

## 2023-10-04 NOTE — TELEPHONE ENCOUNTER
"Called Patient regarding scheduling an appointment with . Offered appointment for 10/13/23 Patient declined and Stated " I will be out of Town when is the next available"?. Offered appointment for 10/19/23 for 2:00PM. Pt confirmed and verbalized understanding.   "

## 2023-10-19 ENCOUNTER — OFFICE VISIT (OUTPATIENT)
Dept: GASTROENTEROLOGY | Facility: CLINIC | Age: 58
End: 2023-10-19

## 2023-10-19 VITALS
HEIGHT: 65 IN | HEART RATE: 82 BPM | SYSTOLIC BLOOD PRESSURE: 113 MMHG | WEIGHT: 211.19 LBS | DIASTOLIC BLOOD PRESSURE: 78 MMHG | BODY MASS INDEX: 35.18 KG/M2

## 2023-10-19 DIAGNOSIS — R11.0 NAUSEA: Primary | ICD-10-CM

## 2023-10-19 PROCEDURE — 99214 OFFICE O/P EST MOD 30 MIN: CPT | Mod: S$PBB,,, | Performed by: INTERNAL MEDICINE

## 2023-10-19 PROCEDURE — 99999 PR PBB SHADOW E&M-EST. PATIENT-LVL III: CPT | Mod: PBBFAC,,, | Performed by: INTERNAL MEDICINE

## 2023-10-19 PROCEDURE — 99214 PR OFFICE/OUTPT VISIT, EST, LEVL IV, 30-39 MIN: ICD-10-PCS | Mod: S$PBB,,, | Performed by: INTERNAL MEDICINE

## 2023-10-19 PROCEDURE — 99213 OFFICE O/P EST LOW 20 MIN: CPT | Mod: PBBFAC | Performed by: INTERNAL MEDICINE

## 2023-10-19 PROCEDURE — 99999 PR PBB SHADOW E&M-EST. PATIENT-LVL III: ICD-10-PCS | Mod: PBBFAC,,, | Performed by: INTERNAL MEDICINE

## 2023-10-19 RX ORDER — OMEPRAZOLE 40 MG/1
40 CAPSULE, DELAYED RELEASE ORAL EVERY MORNING
Qty: 90 CAPSULE | Refills: 0 | Status: SHIPPED | OUTPATIENT
Start: 2023-10-19 | End: 2024-01-17

## 2023-10-19 RX ORDER — ONDANSETRON 4 MG/1
4 TABLET, FILM COATED ORAL EVERY 8 HOURS PRN
Qty: 30 TABLET | Refills: 0 | Status: SHIPPED | OUTPATIENT
Start: 2023-10-19

## 2023-10-19 NOTE — PROGRESS NOTES
Reason for visit: Change in bowel function     HPI:  is a 58 year old lady last seen in August 2023 and prior to that a year ago for change in bowel function. Had noted more soft stool with post prandial urgency over the prior 2-3 months. Had also noted increased gassiness and foul odor. Denied any dysphagia, odynophagia, nausea, vomiting, heartburn or acid regurgitation. Mild upper abdominal pains. No blood mucus in stool or unintentional weight loss. No melena or maroon stools. No recent changes in diet or medications. No family history of IBD, Celiac disease or GI malignancy. No regular NSAIDs, alcohol (drinks 2 glasses of wine every day to every other day) or tobacco use. No recent antibiotic use, travels or sick contacts. Has healthy pets (dogs). No prior history of C.diff. Uses Apple cider vinegar for digestive health. Colonoscopy done at an outside facility 3 years ago (no polyps, was asked to repeat in 10 years). No fecal urgency or incontinence.     Subsequent stool tests revealed decreased pancreas function in 2022. She was asked to cut back on alcohol. CT scan ordered but not scheduled. A new prescription for 2 Creon capsules with each meal was recommended to help digest food. She had not started the prescription. Has been drinking seltzer daily instead of wine. She continues to get nauseated with food and has early satiety. Has also been noticing nocturnal reflux. EGD and Colonoscopy in September 2023 with biopsies were unremarkable. CT abdomen with contrast did not reveal any pancreatic abnormality. Repeat pancreatic elastase in 2023 was normal.    Continues to feel nauseated with decreased oral intake. Has early satiety and has a feeling of food not digesting. Has associated belching. No vomiting. Has a lot of flatulence.        Past medical, surgical, social and family history reviewed in epic     Medication allergies reviewed in epic     Review of systems:     Constitutional:  No fever,  no chills, weight gain over 2 years, appetite is normal; feels fatigued; has hot flashes  Eyes:  No visual changes or red eyes  ENT:  No odynophagia or hoarseness of voice  Cardiovascular:  No angina or palpitation  Respiratory:  No shortness breath or wheezing  Genitourinary:  No dysuria or frequency; has noted nocturia  Musculoskeletal:  No myalgias or arthralgias  Skin:  No pruritus or eczema  Neurologic:  No headache or seizures; has insomnia  Psychiatric:  No anxiety depression  Gastrointestinal:  See HPI     Physical exam:  Vitals see epic, awake, alert, oriented x3 in no acute distress     No physical exam done today.     Prior labs reviewed. No recent imaging or endoscopy reports to review.         Impression: Change in bowel with post prandial urgency and upper abdominal discomfort.     Recommendations:      Reiterated complete abstinence from alcohol  Omeprazole 40 mg daily  Zofran 4 mg prn  Follow up in 2 months. May consider adding enzyme supplement if diarrhea has not improved.

## 2025-03-21 ENCOUNTER — OFFICE VISIT (OUTPATIENT)
Dept: URGENT CARE | Facility: CLINIC | Age: 60
End: 2025-03-21

## 2025-03-21 VITALS
RESPIRATION RATE: 20 BRPM | WEIGHT: 200 LBS | HEART RATE: 82 BPM | BODY MASS INDEX: 33.32 KG/M2 | OXYGEN SATURATION: 98 % | TEMPERATURE: 98 F | DIASTOLIC BLOOD PRESSURE: 68 MMHG | HEIGHT: 65 IN | SYSTOLIC BLOOD PRESSURE: 125 MMHG

## 2025-03-21 DIAGNOSIS — J22 ACUTE LOWER RESPIRATORY INFECTION: Primary | ICD-10-CM

## 2025-03-21 DIAGNOSIS — R05.2 SUBACUTE COUGH: ICD-10-CM

## 2025-03-21 PROCEDURE — 71046 X-RAY EXAM CHEST 2 VIEWS: CPT | Mod: TIER,S$GLB,, | Performed by: RADIOLOGY

## 2025-03-21 RX ORDER — AMOXICILLIN AND CLAVULANATE POTASSIUM 875; 125 MG/1; MG/1
1 TABLET, FILM COATED ORAL EVERY 12 HOURS
Qty: 14 TABLET | Refills: 0 | Status: SHIPPED | OUTPATIENT
Start: 2025-03-21 | End: 2025-03-28

## 2025-03-21 RX ORDER — PROMETHAZINE HYDROCHLORIDE AND DEXTROMETHORPHAN HYDROBROMIDE 6.25; 15 MG/5ML; MG/5ML
5 SYRUP ORAL NIGHTLY PRN
Qty: 118 ML | Refills: 0 | Status: SHIPPED | OUTPATIENT
Start: 2025-03-21 | End: 2025-03-31

## 2025-03-21 RX ORDER — PREDNISONE 20 MG/1
40 TABLET ORAL DAILY
Qty: 6 TABLET | Refills: 0 | Status: SHIPPED | OUTPATIENT
Start: 2025-03-21 | End: 2025-03-24

## 2025-03-21 RX ORDER — BENZONATATE 200 MG/1
200 CAPSULE ORAL 3 TIMES DAILY PRN
Qty: 30 CAPSULE | Refills: 0 | Status: SHIPPED | OUTPATIENT
Start: 2025-03-21 | End: 2025-03-31

## 2025-03-21 NOTE — PROGRESS NOTES
"Subjective:      Patient ID: Farzana Cunningham is a 60 y.o. female.    Vitals:  height is 5' 5" (1.651 m) and weight is 90.7 kg (200 lb). Her oral temperature is 97.9 °F (36.6 °C). Her blood pressure is 125/68 and her pulse is 82. Her respiration is 20 and oxygen saturation is 98%.     Chief Complaint: Fatigue    This is a 60 y.o. female who presents today with a chief complaint of fatigue, cough, thoracic back pain, post nasal drip, & chest congestion that began 2-3 weeks ago. Patient thinks she had covid since she had flu like / covid like symptoms at onset and was exposed to covid 3 weeks ago by family staying in her house at that time. Her symptoms were improving, she traveled to [place], then she started having chest congestion again. No recent fevers. Her back has been hurting her at the thoracic level, off to left side, ' pulling like pain' radiating down on the one side, worse with coughing or movement. .  Patient tried nasal sprays with temporary relief. She has no personal hx of VTE . No swelling or redness or pain in Lower extremities. No hx of tobacco use and no estrogen use/HRT. .     Fatigue  This is a new problem. The current episode started in the past 7 days. The problem occurs constantly. The problem has been gradually worsening. Associated symptoms include congestion, coughing and fatigue. Pertinent negatives include no abdominal pain, arthralgias, change in bowel habit, chest pain, chills, diaphoresis, fever, headaches, joint swelling, myalgias, nausea, neck pain, numbness, rash, sore throat, swollen glands, urinary symptoms, vertigo, visual change, vomiting or weakness. Nothing aggravates the symptoms. Treatments tried: nasal spray. The treatment provided mild relief.       Constitution: Positive for fatigue. Negative for chills, sweating and fever.   HENT:  Positive for congestion and postnasal drip. Negative for sore throat.    Neck: Negative for neck pain.   Cardiovascular:  Negative for " chest pain.   Respiratory:  Positive for cough.    Gastrointestinal:  Negative for abdominal pain, nausea and vomiting.   Musculoskeletal:  Positive for back pain. Negative for joint pain, joint swelling and muscle ache.   Skin:  Negative for rash.   Neurological:  Negative for history of vertigo, headaches and numbness.      Objective:     Physical Exam  Constitutional: Pt oriented to person, place, and time.  Non-toxic appearance.   Patient does not appear ill. No distress. normal  HENT: No icterus or facial swelling appreciated  Head: Normocephalic and atraumatic.   Nose: No congestion.   Pulmonary/Chest: Effort normal. No stridor. No respiratory distress. Cta b/l. Dry cough during exam. There is TTP left side paraspinal musculature thoracic back. No rashes appreciated  Abdominal: Normal appearance. Abdomen exhibits no distension.   Musculoskeletal:         General: No swelling.   Neurological: no focal deficit. Patient is alert and oriented to person, place, and time.   Skin: Skin is not diaphoretic and not pale. no jaundice  Psychiatric: Patients behavior is normal. Mood, judgment and thought content normal.     XR CHEST PA AND LATERAL  Result Date: 3/21/2025  EXAMINATION: XR CHEST PA AND LATERAL CLINICAL HISTORY: Subacute cough TECHNIQUE: PA and lateral views of the chest were performed. COMPARISON: None FINDINGS: The cardiac silhouette is normal in size, midline. The pulmonary vascularity is normal. The pulmonary martha appear normal bilaterally. The lungs are well expanded and clear. No focal airspace disease. No pleural effusion, no pneumothorax. The osseous structures appear within normal limits for age.     No significant abnormality seen. Electronically signed by: Jessi Hendrix MD Date:    03/21/2025 Time:    09:17      Assessment:     1. Acute lower respiratory infection    2. Subacute cough        Plan:       Acute lower respiratory infection  -     amoxicillin-clavulanate 875-125mg (AUGMENTIN)  875-125 mg per tablet; Take 1 tablet by mouth every 12 (twelve) hours. for 7 days  Dispense: 14 tablet; Refill: 0  -     benzonatate (TESSALON) 200 MG capsule; Take 1 capsule (200 mg total) by mouth 3 (three) times daily as needed for Cough.  Dispense: 30 capsule; Refill: 0  -     promethazine-dextromethorphan (PROMETHAZINE-DM) 6.25-15 mg/5 mL Syrp; Take 5 mLs by mouth nightly as needed (cough).  Dispense: 118 mL; Refill: 0  -     predniSONE (DELTASONE) 20 MG tablet; Take 2 tablets (40 mg total) by mouth once daily. for 3 days  Dispense: 6 tablet; Refill: 0    Subacute cough  -     XR CHEST PA AND LATERAL; Future; Expected date: 03/21/2025      Patient Instructions   your chest x-ray did not show any pneumonia or significant fluid on the lungs etc..      Lets ahead and give you 1 antibiotic called amoxicillin clavulanate that can treat if you have an early secondary bacterial infection after having suspected COVID.      Also prescribed cough suppressants benzonatate and the cough syrup promethazine dextromethorphan which can be sedating.    Also prescribed a steroid anti-inflammatory that can help with the inflammation of the lungs with the line your lungs.  You can also add acetaminophen/Tylenol as needed for extra pain control.      If your  back pain is not improving or worsening at any time despite the above treatment or you have any shortness of breath or any other worrisome symptoms I recommend going to the ER and having them re-evaluate you can set that time they can do other labs or studies that we do not have here including studies for blood clots etc..    ---    XR CHEST PA AND LATERAL  Result Date: 3/21/2025  EXAMINATION: XR CHEST PA AND LATERAL CLINICAL HISTORY: Subacute cough TECHNIQUE: PA and lateral views of the chest were performed. COMPARISON: None FINDINGS: The cardiac silhouette is normal in size, midline. The pulmonary vascularity is normal. The pulmonary martha appear normal bilaterally. The lungs  are well expanded and clear. No focal airspace disease. No pleural effusion, no pneumothorax. The osseous structures appear within normal limits for age.     No significant abnormality seen. Electronically signed by: Jessi Hendrix MD Date:    03/21/2025 Time:    09:17

## 2025-03-21 NOTE — PATIENT INSTRUCTIONS
your chest x-ray did not show any pneumonia or significant fluid on the lungs etc..      Lets ahead and give you 1 antibiotic called amoxicillin clavulanate that can treat if you have an early secondary bacterial infection after having suspected COVID.      Also prescribed cough suppressants benzonatate and the cough syrup promethazine dextromethorphan which can be sedating.    Also prescribed a steroid anti-inflammatory that can help with the inflammation of the lungs with the line your lungs.  You can also add acetaminophen/Tylenol as needed for extra pain control.      If your  back pain is not improving or worsening at any time despite the above treatment or you have any shortness of breath or any other worrisome symptoms I recommend going to the ER and having them re-evaluate you can set that time they can do other labs or studies that we do not have here including studies for blood clots etc..    ---    XR CHEST PA AND LATERAL  Result Date: 3/21/2025  EXAMINATION: XR CHEST PA AND LATERAL CLINICAL HISTORY: Subacute cough TECHNIQUE: PA and lateral views of the chest were performed. COMPARISON: None FINDINGS: The cardiac silhouette is normal in size, midline. The pulmonary vascularity is normal. The pulmonary martha appear normal bilaterally. The lungs are well expanded and clear. No focal airspace disease. No pleural effusion, no pneumothorax. The osseous structures appear within normal limits for age.     No significant abnormality seen. Electronically signed by: Jessi Hendrix MD Date:    03/21/2025 Time:    09:17       no

## 2025-04-18 ENCOUNTER — OFFICE VISIT (OUTPATIENT)
Dept: URGENT CARE | Facility: CLINIC | Age: 60
End: 2025-04-18

## 2025-04-18 VITALS
HEIGHT: 65 IN | DIASTOLIC BLOOD PRESSURE: 86 MMHG | OXYGEN SATURATION: 98 % | BODY MASS INDEX: 34.71 KG/M2 | HEART RATE: 76 BPM | SYSTOLIC BLOOD PRESSURE: 131 MMHG | WEIGHT: 208.31 LBS | TEMPERATURE: 98 F | RESPIRATION RATE: 17 BRPM

## 2025-04-18 DIAGNOSIS — L03.319 CELLULITIS AND ABSCESS OF TRUNK: Primary | ICD-10-CM

## 2025-04-18 DIAGNOSIS — L02.219 CELLULITIS AND ABSCESS OF TRUNK: Primary | ICD-10-CM

## 2025-04-18 RX ORDER — SULFAMETHOXAZOLE AND TRIMETHOPRIM 800; 160 MG/1; MG/1
1 TABLET ORAL 2 TIMES DAILY
Qty: 14 TABLET | Refills: 0 | Status: SHIPPED | OUTPATIENT
Start: 2025-04-18 | End: 2025-04-25

## 2025-04-18 RX ORDER — MUPIROCIN 20 MG/G
OINTMENT TOPICAL 3 TIMES DAILY
Qty: 22 G | Refills: 0 | Status: SHIPPED | OUTPATIENT
Start: 2025-04-18

## 2025-04-18 NOTE — PATIENT INSTRUCTIONS
- You must understand that you have received an Urgent Care treatment only and that you may be released before all of your medical problems are known or treated.   - You, the patient, will arrange for follow up care as instructed.   - If your condition worsens or fails to improve we recommend that you receive another evaluation at the ER immediately or contact your PCP to discuss your concerns.   - You can call [phone] or [phone] to help schedule an appointment with the appropriate provider.    Soak affected in warm water with epsom salts several times a day. Dilute 2 cups per gallon of water. If you cannot soak  apply warm compresses to the affected area for 20 min, 3-5 times per day and apply warm compresses frequently. If you cannot soak, use the shower head.  If you were prescribed antibiotics, please take them to completion.  Please return here in 1-3 days for a recheck of your wound.  If not allergic, please take over the counter Tylenol (Acetaminophen) and/or Motrin (Ibuprofen) as directed for control of pain and/or fever.

## 2025-04-18 NOTE — PROGRESS NOTES
"Subjective:      Patient ID: Farzana Cunningham is a 60 y.o. female.    Vitals:  height is 5' 5" (1.651 m) and weight is 94.5 kg (208 lb 5.4 oz). Her oral temperature is 98.4 °F (36.9 °C). Her blood pressure is 131/86 and her pulse is 76. Her respiration is 17 and oxygen saturation is 98%.     Chief Complaint: Abscess    Pt is a 59 yo female who presents today w/ 3 to 4 cm abscess on lt side of upper back (lt shoulder blade ) that began approx a week ago. Pt c/o redness, slight edema, pain, tenderness to touch and warmth of the area.  Pt denies trauma, drainage, fever, loss of sensation and emesis. Pt applied neosporin with no relief.     Abscess  Chronicity:  NewProgression Since Onset: gradually worsening  Size:  3-5cm  Abscess location: lt side of upper (lt shoulder blade )  Characteristics: painful, redness and swelling    Characteristics: not draining, no itching, no dryness, no scaling, no peeling, no bruising and no blistering    Pain Scale:  7/10  Ineffective treatments: neopsorin.  Relieved by:  Nothing  Exacerbated by: touch.      Skin:  Positive for abscess.      Objective:     Physical Exam   Constitutional: She is oriented to person, place, and time.   HENT:   Head: Normocephalic.   Ears:   Right Ear: External ear normal.   Left Ear: External ear normal.   Nose: Nose normal.   Mouth/Throat: Mucous membranes are moist.   Eyes: Conjunctivae are normal.   Cardiovascular: Normal rate.   Pulmonary/Chest: Effort normal.   Musculoskeletal: Normal range of motion.         General: Normal range of motion.   Neurological: She is alert and oriented to person, place, and time.   Skin: Skin is dry and abscessed.        Psychiatric: Her behavior is normal.   Nursing note and vitals reviewed.      Assessment:     1. Cellulitis and abscess of trunk        Plan:       Cellulitis and abscess of trunk  -     sulfamethoxazole-trimethoprim 800-160mg (BACTRIM DS) 800-160 mg Tab; Take 1 tablet by mouth 2 (two) times daily. for 7 " days  Dispense: 14 tablet; Refill: 0  -     mupirocin (BACTROBAN) 2 % ointment; Apply topically 3 (three) times daily.  Dispense: 22 g; Refill: 0      Patient Instructions   - You must understand that you have received an Urgent Care treatment only and that you may be released before all of your medical problems are known or treated.   - You, the patient, will arrange for follow up care as instructed.   - If your condition worsens or fails to improve we recommend that you receive another evaluation at the ER immediately or contact your PCP to discuss your concerns.   - You can call [phone] or [phone] to help schedule an appointment with the appropriate provider.    Soak affected in warm water with epsom salts several times a day. Dilute 2 cups per gallon of water. If you cannot soak  apply warm compresses to the affected area for 20 min, 3-5 times per day and apply warm compresses frequently. If you cannot soak, use the shower head.  If you were prescribed antibiotics, please take them to completion.  Please return here in 1-3 days for a recheck of your wound.  If not allergic, please take over the counter Tylenol (Acetaminophen) and/or Motrin (Ibuprofen) as directed for control of pain and/or fever.

## 2025-04-22 ENCOUNTER — OFFICE VISIT (OUTPATIENT)
Dept: URGENT CARE | Facility: CLINIC | Age: 60
End: 2025-04-22

## 2025-04-22 VITALS
BODY MASS INDEX: 34.71 KG/M2 | OXYGEN SATURATION: 96 % | RESPIRATION RATE: 17 BRPM | DIASTOLIC BLOOD PRESSURE: 70 MMHG | SYSTOLIC BLOOD PRESSURE: 127 MMHG | HEART RATE: 87 BPM | WEIGHT: 208.31 LBS | TEMPERATURE: 98 F | HEIGHT: 65 IN

## 2025-04-22 DIAGNOSIS — L02.219 CELLULITIS AND ABSCESS OF TRUNK: Primary | ICD-10-CM

## 2025-04-22 DIAGNOSIS — L03.319 CELLULITIS AND ABSCESS OF TRUNK: Primary | ICD-10-CM

## 2025-04-22 PROCEDURE — 10060 I&D ABSCESS SIMPLE/SINGLE: CPT | Mod: S$GLB,,, | Performed by: FAMILY MEDICINE

## 2025-04-22 PROCEDURE — 96372 THER/PROPH/DIAG INJ SC/IM: CPT | Mod: S$GLB,,, | Performed by: FAMILY MEDICINE

## 2025-04-22 PROCEDURE — 99213 OFFICE O/P EST LOW 20 MIN: CPT | Mod: 25,S$GLB,, | Performed by: FAMILY MEDICINE

## 2025-04-22 RX ORDER — CEFTRIAXONE 1 G/1
1 INJECTION, POWDER, FOR SOLUTION INTRAMUSCULAR; INTRAVENOUS
Status: COMPLETED | OUTPATIENT
Start: 2025-04-22 | End: 2025-04-22

## 2025-04-22 RX ORDER — LIDOCAINE HYDROCHLORIDE 10 MG/ML
2.1 INJECTION, SOLUTION INFILTRATION; PERINEURAL
Status: COMPLETED | OUTPATIENT
Start: 2025-04-22 | End: 2025-04-22

## 2025-04-22 RX ADMIN — CEFTRIAXONE 1 G: 1 INJECTION, POWDER, FOR SOLUTION INTRAMUSCULAR; INTRAVENOUS at 09:04

## 2025-04-22 RX ADMIN — LIDOCAINE HYDROCHLORIDE 2.1 ML: 10 INJECTION, SOLUTION INFILTRATION; PERINEURAL at 09:04

## 2025-04-22 NOTE — PROGRESS NOTES
"Subjective:      Patient ID: Farzana Cunningham is a 60 y.o. female.    Vitals:  height is 5' 5" (1.651 m) and weight is 94.5 kg (208 lb 5.4 oz). Her oral temperature is 98.2 °F (36.8 °C). Her blood pressure is 127/70 and her pulse is 87. Her respiration is 17 and oxygen saturation is 96%.     Chief Complaint: Follow-up    Pt presents today w/ f/u for dx abscess ; initial onset approx 2x weeks ago. Pt reports she was seen here approx 5x days ago and dx abscess on lt side of upper back (lt shoulder blade ) . Pt states sx have worsened; pt c/o redness , moderate edema , burning sensation , px to touch and pus like drainage. Pt denies any new trauma , loss of sensation , fever and emesis. Pt was rx oral and topical abx with no relief.     Abscess  Chronicity:  NewProgression Since Onset: gradually worsening  Abscess location: back.  Associated Symptoms: no fever, no chills  Characteristics: draining, painful, redness and swelling    Characteristics: no itching, no dryness, no scaling, no peeling, no bruising and no blistering    Pain Scale:  8/10  Treatments Tried:  Topical antibiotics and oral antibiotics (bactrim and bactroban)  Relieved by:  Nothing  Worsened by:  Nothing      Constitution: Negative for chills and fever.   Skin:  Positive for erythema and abscess.      Objective:     Physical Exam   Cardiovascular: Normal rate, regular rhythm, normal heart sounds and normal pulses.   Pulmonary/Chest: Effort normal and breath sounds normal.   Abdominal: Normal appearance.   Neurological: She is alert.   Skin: erythema and lesion (3 cm diameter left upper back area of erythema with scattered pustules)   Nursing note and vitals reviewed.      Assessment:     1. Cellulitis and abscess of trunk        Plan:       Cellulitis and abscess of trunk  -     cefTRIAXone injection 1 g  -     LIDOcaine HCL 10 mg/ml (1%) injection 2.1 mL  -     Incision & Drainage    Continue with Bactrim DS and bactroban. RTC prn worsneing " symptoms  Warm compresses to affected area

## 2025-04-22 NOTE — PROCEDURES
"Incision & Drainage    Date/Time: 4/22/2025 8:35 AM    Performed by: Ivan Madrigal MD  Authorized by: Ivan Madrigal MD    Time out: Immediately prior to procedure a "time out" was called to verify the correct patient, procedure, equipment, support staff and site/side marked as required.    Consent Done?:  Yes (Verbal)    Type:  Abscess  Body area:  Trunk  Location details:  Back  Anesthesia:  Local infiltration  Local anesthetic: Lidocaine 2% with epinephrine  Anesthetic total (ml):  4  Scalpel size:  11  Incision type:  Single straight  Complexity:  Simple  Drainage:  Pus, bloody and purulent  Drainage amount:  Scant  Wound treatment:  Incision, drainage and wound left open  Packing material:  None  Patient tolerance:  Patient tolerated the procedure well with no immediate complications    "